# Patient Record
Sex: FEMALE | Race: WHITE | NOT HISPANIC OR LATINO
[De-identification: names, ages, dates, MRNs, and addresses within clinical notes are randomized per-mention and may not be internally consistent; named-entity substitution may affect disease eponyms.]

---

## 2020-05-12 VITALS
WEIGHT: 149.91 LBS | OXYGEN SATURATION: 100 % | SYSTOLIC BLOOD PRESSURE: 134 MMHG | TEMPERATURE: 98 F | HEART RATE: 54 BPM | RESPIRATION RATE: 16 BRPM | DIASTOLIC BLOOD PRESSURE: 86 MMHG | HEIGHT: 64 IN

## 2020-05-13 ENCOUNTER — RESULT REVIEW (OUTPATIENT)
Age: 40
End: 2020-05-13

## 2020-05-13 ENCOUNTER — INPATIENT (INPATIENT)
Facility: HOSPITAL | Age: 40
LOS: 1 days | Discharge: HOME | DRG: 25 | End: 2020-05-15
Attending: NEUROLOGICAL SURGERY | Admitting: NEUROLOGICAL SURGERY
Payer: COMMERCIAL

## 2020-05-13 DIAGNOSIS — C71.8 MALIGNANT NEOPLASM OF OVERLAPPING SITES OF BRAIN: ICD-10-CM

## 2020-05-13 DIAGNOSIS — Z88.2 ALLERGY STATUS TO SULFONAMIDES: ICD-10-CM

## 2020-05-13 DIAGNOSIS — G93.6 CEREBRAL EDEMA: ICD-10-CM

## 2020-05-13 DIAGNOSIS — H53.462 HOMONYMOUS BILATERAL FIELD DEFECTS, LEFT SIDE: ICD-10-CM

## 2020-05-13 DIAGNOSIS — D43.2 NEOPLASM OF UNCERTAIN BEHAVIOR OF BRAIN, UNSPECIFIED: ICD-10-CM

## 2020-05-13 DIAGNOSIS — G93.5 COMPRESSION OF BRAIN: ICD-10-CM

## 2020-05-13 LAB
ANION GAP SERPL CALC-SCNC: 11 MMOL/L — SIGNIFICANT CHANGE UP (ref 5–17)
BLD GP AB SCN SERPL QL: NEGATIVE — SIGNIFICANT CHANGE UP
BUN SERPL-MCNC: 13 MG/DL — SIGNIFICANT CHANGE UP (ref 7–23)
CALCIUM SERPL-MCNC: 8.2 MG/DL — LOW (ref 8.4–10.5)
CHLORIDE SERPL-SCNC: 108 MMOL/L — SIGNIFICANT CHANGE UP (ref 96–108)
CO2 SERPL-SCNC: 20 MMOL/L — LOW (ref 22–31)
CREAT SERPL-MCNC: 0.52 MG/DL — SIGNIFICANT CHANGE UP (ref 0.5–1.3)
GLUCOSE BLDC GLUCOMTR-MCNC: 122 MG/DL — HIGH (ref 70–99)
GLUCOSE BLDC GLUCOMTR-MCNC: 134 MG/DL — HIGH (ref 70–99)
GLUCOSE SERPL-MCNC: 135 MG/DL — HIGH (ref 70–99)
HCT VFR BLD CALC: 37.7 % — SIGNIFICANT CHANGE UP (ref 34.5–45)
HGB BLD-MCNC: 12 G/DL — SIGNIFICANT CHANGE UP (ref 11.5–15.5)
MAGNESIUM SERPL-MCNC: 2 MG/DL — SIGNIFICANT CHANGE UP (ref 1.6–2.6)
MCHC RBC-ENTMCNC: 29.8 PG — SIGNIFICANT CHANGE UP (ref 27–34)
MCHC RBC-ENTMCNC: 31.8 GM/DL — LOW (ref 32–36)
MCV RBC AUTO: 93.5 FL — SIGNIFICANT CHANGE UP (ref 80–100)
NRBC # BLD: 0 /100 WBCS — SIGNIFICANT CHANGE UP (ref 0–0)
PHOSPHATE SERPL-MCNC: 2.7 MG/DL — SIGNIFICANT CHANGE UP (ref 2.5–4.5)
PLATELET # BLD AUTO: 225 K/UL — SIGNIFICANT CHANGE UP (ref 150–400)
POTASSIUM SERPL-MCNC: 3.9 MMOL/L — SIGNIFICANT CHANGE UP (ref 3.5–5.3)
POTASSIUM SERPL-SCNC: 3.9 MMOL/L — SIGNIFICANT CHANGE UP (ref 3.5–5.3)
RBC # BLD: 4.03 M/UL — SIGNIFICANT CHANGE UP (ref 3.8–5.2)
RBC # FLD: 11.8 % — SIGNIFICANT CHANGE UP (ref 10.3–14.5)
RH IG SCN BLD-IMP: POSITIVE — SIGNIFICANT CHANGE UP
SODIUM SERPL-SCNC: 139 MMOL/L — SIGNIFICANT CHANGE UP (ref 135–145)
WBC # BLD: 6.65 K/UL — SIGNIFICANT CHANGE UP (ref 3.8–10.5)
WBC # FLD AUTO: 6.65 K/UL — SIGNIFICANT CHANGE UP (ref 3.8–10.5)

## 2020-05-13 PROCEDURE — 70450 CT HEAD/BRAIN W/O DYE: CPT | Mod: 26

## 2020-05-13 PROCEDURE — 88331 PATH CONSLTJ SURG 1 BLK 1SPC: CPT | Mod: 26

## 2020-05-13 PROCEDURE — 88360 TUMOR IMMUNOHISTOCHEM/MANUAL: CPT | Mod: 26

## 2020-05-13 PROCEDURE — 70552 MRI BRAIN STEM W/DYE: CPT | Mod: 26

## 2020-05-13 PROCEDURE — 88341 IMHCHEM/IMCYTCHM EA ADD ANTB: CPT | Mod: 26,59

## 2020-05-13 PROCEDURE — 88342 IMHCHEM/IMCYTCHM 1ST ANTB: CPT | Mod: 26,59

## 2020-05-13 PROCEDURE — 88307 TISSUE EXAM BY PATHOLOGIST: CPT | Mod: 26

## 2020-05-13 PROCEDURE — 88334 PATH CONSLTJ SURG CYTO XM EA: CPT | Mod: 26,59

## 2020-05-13 RX ORDER — DEXTROSE 50 % IN WATER 50 %
25 SYRINGE (ML) INTRAVENOUS ONCE
Refills: 0 | Status: DISCONTINUED | OUTPATIENT
Start: 2020-05-13 | End: 2020-05-15

## 2020-05-13 RX ORDER — LEVETIRACETAM 250 MG/1
500 TABLET, FILM COATED ORAL
Refills: 0 | Status: DISCONTINUED | OUTPATIENT
Start: 2020-05-13 | End: 2020-05-15

## 2020-05-13 RX ORDER — OXYCODONE AND ACETAMINOPHEN 5; 325 MG/1; MG/1
1 TABLET ORAL EVERY 4 HOURS
Refills: 0 | Status: DISCONTINUED | OUTPATIENT
Start: 2020-05-13 | End: 2020-05-15

## 2020-05-13 RX ORDER — DEXTROSE 50 % IN WATER 50 %
12.5 SYRINGE (ML) INTRAVENOUS ONCE
Refills: 0 | Status: DISCONTINUED | OUTPATIENT
Start: 2020-05-13 | End: 2020-05-15

## 2020-05-13 RX ORDER — POVIDONE-IODINE 5 %
1 AEROSOL (ML) TOPICAL ONCE
Refills: 0 | Status: DISCONTINUED | OUTPATIENT
Start: 2020-05-13 | End: 2020-05-13

## 2020-05-13 RX ORDER — DEXTROSE 50 % IN WATER 50 %
15 SYRINGE (ML) INTRAVENOUS ONCE
Refills: 0 | Status: DISCONTINUED | OUTPATIENT
Start: 2020-05-13 | End: 2020-05-15

## 2020-05-13 RX ORDER — HYDROMORPHONE HYDROCHLORIDE 2 MG/ML
0.5 INJECTION INTRAMUSCULAR; INTRAVENOUS; SUBCUTANEOUS EVERY 4 HOURS
Refills: 0 | Status: DISCONTINUED | OUTPATIENT
Start: 2020-05-13 | End: 2020-05-15

## 2020-05-13 RX ORDER — DEXTROSE MONOHYDRATE, SODIUM CHLORIDE, AND POTASSIUM CHLORIDE 50; .745; 4.5 G/1000ML; G/1000ML; G/1000ML
1000 INJECTION, SOLUTION INTRAVENOUS
Refills: 0 | Status: DISCONTINUED | OUTPATIENT
Start: 2020-05-13 | End: 2020-05-14

## 2020-05-13 RX ORDER — OXYCODONE AND ACETAMINOPHEN 5; 325 MG/1; MG/1
2 TABLET ORAL EVERY 6 HOURS
Refills: 0 | Status: DISCONTINUED | OUTPATIENT
Start: 2020-05-13 | End: 2020-05-15

## 2020-05-13 RX ORDER — ACETAMINOPHEN 500 MG
1000 TABLET ORAL ONCE
Refills: 0 | Status: COMPLETED | OUTPATIENT
Start: 2020-05-13 | End: 2020-05-13

## 2020-05-13 RX ORDER — PANTOPRAZOLE SODIUM 20 MG/1
40 TABLET, DELAYED RELEASE ORAL
Refills: 0 | Status: DISCONTINUED | OUTPATIENT
Start: 2020-05-13 | End: 2020-05-15

## 2020-05-13 RX ORDER — GLUCAGON INJECTION, SOLUTION 0.5 MG/.1ML
1 INJECTION, SOLUTION SUBCUTANEOUS ONCE
Refills: 0 | Status: DISCONTINUED | OUTPATIENT
Start: 2020-05-13 | End: 2020-05-15

## 2020-05-13 RX ORDER — INSULIN LISPRO 100/ML
VIAL (ML) SUBCUTANEOUS
Refills: 0 | Status: DISCONTINUED | OUTPATIENT
Start: 2020-05-13 | End: 2020-05-15

## 2020-05-13 RX ORDER — DEXAMETHASONE 0.5 MG/5ML
4 ELIXIR ORAL EVERY 6 HOURS
Refills: 0 | Status: DISCONTINUED | OUTPATIENT
Start: 2020-05-13 | End: 2020-05-14

## 2020-05-13 RX ORDER — HYDROMORPHONE HYDROCHLORIDE 2 MG/ML
0.5 INJECTION INTRAMUSCULAR; INTRAVENOUS; SUBCUTANEOUS ONCE
Refills: 0 | Status: DISCONTINUED | OUTPATIENT
Start: 2020-05-13 | End: 2020-05-13

## 2020-05-13 RX ORDER — CEFAZOLIN SODIUM 1 G
2000 VIAL (EA) INJECTION EVERY 8 HOURS
Refills: 0 | Status: COMPLETED | OUTPATIENT
Start: 2020-05-13 | End: 2020-05-14

## 2020-05-13 RX ORDER — SODIUM CHLORIDE 9 MG/ML
1000 INJECTION, SOLUTION INTRAVENOUS
Refills: 0 | Status: DISCONTINUED | OUTPATIENT
Start: 2020-05-13 | End: 2020-05-15

## 2020-05-13 RX ADMIN — Medication 400 MILLIGRAM(S): at 12:18

## 2020-05-13 RX ADMIN — HYDROMORPHONE HYDROCHLORIDE 0.5 MILLIGRAM(S): 2 INJECTION INTRAMUSCULAR; INTRAVENOUS; SUBCUTANEOUS at 13:18

## 2020-05-13 RX ADMIN — Medication 4 MILLIGRAM(S): at 14:08

## 2020-05-13 RX ADMIN — LEVETIRACETAM 500 MILLIGRAM(S): 250 TABLET, FILM COATED ORAL at 17:03

## 2020-05-13 RX ADMIN — Medication 100 MILLIGRAM(S): at 14:56

## 2020-05-13 RX ADMIN — DEXTROSE MONOHYDRATE, SODIUM CHLORIDE, AND POTASSIUM CHLORIDE 75 MILLILITER(S): 50; .745; 4.5 INJECTION, SOLUTION INTRAVENOUS at 13:12

## 2020-05-13 RX ADMIN — Medication 100 MILLIGRAM(S): at 22:06

## 2020-05-13 RX ADMIN — Medication 1 TABLET(S): at 12:46

## 2020-05-13 RX ADMIN — Medication 4 MILLIGRAM(S): at 20:57

## 2020-05-13 NOTE — H&P PST ADULT - ASSESSMENT
40 year old female here for elective right craniotomy for parietal / occipital mass    -ICU postop  -CT   -MRI  -decadron / keppra

## 2020-05-13 NOTE — H&P PST ADULT - HISTORY OF PRESENT ILLNESS
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 She was seen originally at [facility] for evaluation of headaches, and signs of blurry vision.    She presents now 5/13/20 for right craniotomy resection of brain tumor.

## 2020-05-13 NOTE — PROGRESS NOTE ADULT - ASSESSMENT
POD 0 s/p right crani and tumor resection with EVD in place neuro intact post op   q1 hr neuro checks  CTH now for follow up  q1h vitals  pain control  HOB 30 deg  trx to NSICU pending bed avail  keppra ppx  decadron 4q6h  MRI in 48 hrs  ADAT  NS hydration  PPI  post op labs stable  lovenox ppx tomorrow  PT/OT pending  d/w Dr. Montes De Oca and Bronwyn

## 2020-05-13 NOTE — PACU DISCHARGE NOTE - COMMENTS
pt met criteria for discharge - s/p right parietal/ occipital craniotomy with helmet dressing-  neurologically intact- AXOX4- follow commands , moving all extremities- + PERRLA- No neurological and sensory deficits- tolerating room air- denies Pain at present- EVD to level of tragus @ 10cm H20- ICP WNL- keller draining increasingly up to 250ml/hr for 10pm output-Messi Sterling notified- no intervention at present -continue to monitor - report given to 5 [facility] nurse- pt left unit via bed on monitor to [id number] accompanied by PA and RN

## 2020-05-13 NOTE — PROGRESS NOTE ADULT - SUBJECTIVE AND OBJECTIVE BOX
Post op Note    Dx: brain tumor    40y    Female   s/p right crani and tumor resection this morning. No complications intra op, post op transferred to PACU extubated. Post op reports moderate headache, dosed with 0.5mg IV dilaudid. Denies any blurry vision, n/v or blury vision. Denies any weakness, numbness or tingling.           T(C): 36.2 (05-13-20 @ 11:50), Max: 36.4 (05-12-20 @ 13:29)  HR: 52 (05-13-20 @ 13:00) (50 - 67)  BP: 114/68 (05-13-20 @ 13:00) (106/60 - 134/86)  RR: 16 (05-13-20 @ 13:00) (10 - 16)  SpO2: 100% (05-13-20 @ 13:00) (100% - 100%)  Wt(kg): --      Physical exam  Awake, alert, oriented x 3, PERRL, EOMI  tongue midline  face symmetric  Follows commands, speech clear  BARRAGAN X4 with good strength   head dressing: C/D/I

## 2020-05-14 DIAGNOSIS — D49.6 NEOPLASM OF UNSPECIFIED BEHAVIOR OF BRAIN: ICD-10-CM

## 2020-05-14 LAB
A1C WITH ESTIMATED AVERAGE GLUCOSE RESULT: 4.5 % — SIGNIFICANT CHANGE UP (ref 4–5.6)
ANION GAP SERPL CALC-SCNC: 10 MMOL/L — SIGNIFICANT CHANGE UP (ref 5–17)
ANION GAP SERPL CALC-SCNC: 11 MMOL/L — SIGNIFICANT CHANGE UP (ref 5–17)
ANION GAP SERPL CALC-SCNC: 13 MMOL/L — SIGNIFICANT CHANGE UP (ref 5–17)
APPEARANCE UR: CLEAR — SIGNIFICANT CHANGE UP
APPEARANCE UR: CLEAR — SIGNIFICANT CHANGE UP
BILIRUB UR-MCNC: NEGATIVE — SIGNIFICANT CHANGE UP
BILIRUB UR-MCNC: NEGATIVE — SIGNIFICANT CHANGE UP
BUN SERPL-MCNC: 10 MG/DL — SIGNIFICANT CHANGE UP (ref 7–23)
BUN SERPL-MCNC: 8 MG/DL — SIGNIFICANT CHANGE UP (ref 7–23)
BUN SERPL-MCNC: 9 MG/DL — SIGNIFICANT CHANGE UP (ref 7–23)
CALCIUM SERPL-MCNC: 8.4 MG/DL — SIGNIFICANT CHANGE UP (ref 8.4–10.5)
CALCIUM SERPL-MCNC: 8.8 MG/DL — SIGNIFICANT CHANGE UP (ref 8.4–10.5)
CALCIUM SERPL-MCNC: 8.8 MG/DL — SIGNIFICANT CHANGE UP (ref 8.4–10.5)
CHLORIDE SERPL-SCNC: 102 MMOL/L — SIGNIFICANT CHANGE UP (ref 96–108)
CHLORIDE SERPL-SCNC: 104 MMOL/L — SIGNIFICANT CHANGE UP (ref 96–108)
CHLORIDE SERPL-SCNC: 104 MMOL/L — SIGNIFICANT CHANGE UP (ref 96–108)
CO2 SERPL-SCNC: 21 MMOL/L — LOW (ref 22–31)
CO2 SERPL-SCNC: 24 MMOL/L — SIGNIFICANT CHANGE UP (ref 22–31)
CO2 SERPL-SCNC: 24 MMOL/L — SIGNIFICANT CHANGE UP (ref 22–31)
COLOR SPEC: YELLOW — SIGNIFICANT CHANGE UP
COLOR SPEC: YELLOW — SIGNIFICANT CHANGE UP
CREAT SERPL-MCNC: 0.48 MG/DL — LOW (ref 0.5–1.3)
CREAT SERPL-MCNC: 0.48 MG/DL — LOW (ref 0.5–1.3)
CREAT SERPL-MCNC: 0.52 MG/DL — SIGNIFICANT CHANGE UP (ref 0.5–1.3)
DIFF PNL FLD: ABNORMAL
DIFF PNL FLD: NEGATIVE — SIGNIFICANT CHANGE UP
ESTIMATED AVERAGE GLUCOSE: 82 MG/DL — SIGNIFICANT CHANGE UP (ref 68–114)
GLUCOSE BLDC GLUCOMTR-MCNC: 107 MG/DL — HIGH (ref 70–99)
GLUCOSE BLDC GLUCOMTR-MCNC: 117 MG/DL — HIGH (ref 70–99)
GLUCOSE BLDC GLUCOMTR-MCNC: 125 MG/DL — HIGH (ref 70–99)
GLUCOSE BLDC GLUCOMTR-MCNC: 132 MG/DL — HIGH (ref 70–99)
GLUCOSE SERPL-MCNC: 124 MG/DL — HIGH (ref 70–99)
GLUCOSE SERPL-MCNC: 138 MG/DL — HIGH (ref 70–99)
GLUCOSE SERPL-MCNC: 166 MG/DL — HIGH (ref 70–99)
GLUCOSE UR QL: NEGATIVE — SIGNIFICANT CHANGE UP
GLUCOSE UR QL: NEGATIVE — SIGNIFICANT CHANGE UP
HCT VFR BLD CALC: 38.8 % — SIGNIFICANT CHANGE UP (ref 34.5–45)
HGB BLD-MCNC: 12.4 G/DL — SIGNIFICANT CHANGE UP (ref 11.5–15.5)
KETONES UR-MCNC: ABNORMAL MG/DL
KETONES UR-MCNC: NEGATIVE — SIGNIFICANT CHANGE UP
LEUKOCYTE ESTERASE UR-ACNC: NEGATIVE — SIGNIFICANT CHANGE UP
LEUKOCYTE ESTERASE UR-ACNC: NEGATIVE — SIGNIFICANT CHANGE UP
MAGNESIUM SERPL-MCNC: 2 MG/DL — SIGNIFICANT CHANGE UP (ref 1.6–2.6)
MCHC RBC-ENTMCNC: 29.8 PG — SIGNIFICANT CHANGE UP (ref 27–34)
MCHC RBC-ENTMCNC: 32 GM/DL — SIGNIFICANT CHANGE UP (ref 32–36)
MCV RBC AUTO: 93.3 FL — SIGNIFICANT CHANGE UP (ref 80–100)
NITRITE UR-MCNC: NEGATIVE — SIGNIFICANT CHANGE UP
NITRITE UR-MCNC: NEGATIVE — SIGNIFICANT CHANGE UP
NRBC # BLD: 0 /100 WBCS — SIGNIFICANT CHANGE UP (ref 0–0)
OSMOLALITY SERPL: 284 MOSMOL/KG — SIGNIFICANT CHANGE UP (ref 275–300)
OSMOLALITY UR: 322 MOSMOL/KG — SIGNIFICANT CHANGE UP (ref 100–650)
PH UR: 7 — SIGNIFICANT CHANGE UP (ref 5–8)
PH UR: 7 — SIGNIFICANT CHANGE UP (ref 5–8)
PHOSPHATE SERPL-MCNC: 2.6 MG/DL — SIGNIFICANT CHANGE UP (ref 2.5–4.5)
PLATELET # BLD AUTO: 246 K/UL — SIGNIFICANT CHANGE UP (ref 150–400)
POTASSIUM SERPL-MCNC: 4.1 MMOL/L — SIGNIFICANT CHANGE UP (ref 3.5–5.3)
POTASSIUM SERPL-MCNC: 4.2 MMOL/L — SIGNIFICANT CHANGE UP (ref 3.5–5.3)
POTASSIUM SERPL-MCNC: 4.2 MMOL/L — SIGNIFICANT CHANGE UP (ref 3.5–5.3)
POTASSIUM SERPL-SCNC: 4.1 MMOL/L — SIGNIFICANT CHANGE UP (ref 3.5–5.3)
POTASSIUM SERPL-SCNC: 4.2 MMOL/L — SIGNIFICANT CHANGE UP (ref 3.5–5.3)
POTASSIUM SERPL-SCNC: 4.2 MMOL/L — SIGNIFICANT CHANGE UP (ref 3.5–5.3)
PROT UR-MCNC: NEGATIVE MG/DL — SIGNIFICANT CHANGE UP
PROT UR-MCNC: NEGATIVE MG/DL — SIGNIFICANT CHANGE UP
RBC # BLD: 4.16 M/UL — SIGNIFICANT CHANGE UP (ref 3.8–5.2)
RBC # FLD: 11.9 % — SIGNIFICANT CHANGE UP (ref 10.3–14.5)
SODIUM SERPL-SCNC: 137 MMOL/L — SIGNIFICANT CHANGE UP (ref 135–145)
SODIUM SERPL-SCNC: 138 MMOL/L — SIGNIFICANT CHANGE UP (ref 135–145)
SODIUM SERPL-SCNC: 138 MMOL/L — SIGNIFICANT CHANGE UP (ref 135–145)
SODIUM UR-SCNC: 131 MMOL/L — SIGNIFICANT CHANGE UP
SP GR SPEC: 1.02 — SIGNIFICANT CHANGE UP (ref 1–1.03)
SP GR SPEC: 1.02 — SIGNIFICANT CHANGE UP (ref 1–1.03)
UROBILINOGEN FLD QL: 0.2 E.U./DL — SIGNIFICANT CHANGE UP
UROBILINOGEN FLD QL: 0.2 E.U./DL — SIGNIFICANT CHANGE UP
WBC # BLD: 12.97 K/UL — HIGH (ref 3.8–10.5)
WBC # FLD AUTO: 12.97 K/UL — HIGH (ref 3.8–10.5)

## 2020-05-14 PROCEDURE — 70553 MRI BRAIN STEM W/O & W/DYE: CPT | Mod: 26

## 2020-05-14 PROCEDURE — 99024 POSTOP FOLLOW-UP VISIT: CPT

## 2020-05-14 RX ORDER — ACETAMINOPHEN 500 MG
650 TABLET ORAL EVERY 6 HOURS
Refills: 0 | Status: DISCONTINUED | OUTPATIENT
Start: 2020-05-14 | End: 2020-05-15

## 2020-05-14 RX ORDER — DEXAMETHASONE 0.5 MG/5ML
ELIXIR ORAL
Refills: 0 | Status: DISCONTINUED | OUTPATIENT
Start: 2020-05-14 | End: 2020-05-15

## 2020-05-14 RX ORDER — DEXAMETHASONE 0.5 MG/5ML
2 ELIXIR ORAL EVERY 8 HOURS
Refills: 0 | Status: CANCELLED | OUTPATIENT
Start: 2020-05-18 | End: 2020-05-15

## 2020-05-14 RX ORDER — SODIUM CHLORIDE 9 MG/ML
1000 INJECTION INTRAMUSCULAR; INTRAVENOUS; SUBCUTANEOUS ONCE
Refills: 0 | Status: COMPLETED | OUTPATIENT
Start: 2020-05-14 | End: 2020-05-14

## 2020-05-14 RX ORDER — DEXAMETHASONE 0.5 MG/5ML
2 ELIXIR ORAL
Refills: 0 | Status: CANCELLED | OUTPATIENT
Start: 2020-05-20 | End: 2020-05-15

## 2020-05-14 RX ORDER — DEXAMETHASONE 0.5 MG/5ML
4 ELIXIR ORAL EVERY 6 HOURS
Refills: 0 | Status: DISCONTINUED | OUTPATIENT
Start: 2020-05-14 | End: 2020-05-15

## 2020-05-14 RX ORDER — SENNA PLUS 8.6 MG/1
2 TABLET ORAL AT BEDTIME
Refills: 0 | Status: DISCONTINUED | OUTPATIENT
Start: 2020-05-14 | End: 2020-05-15

## 2020-05-14 RX ORDER — DEXTROSE MONOHYDRATE, SODIUM CHLORIDE, AND POTASSIUM CHLORIDE 50; .745; 4.5 G/1000ML; G/1000ML; G/1000ML
1000 INJECTION, SOLUTION INTRAVENOUS
Refills: 0 | Status: DISCONTINUED | OUTPATIENT
Start: 2020-05-14 | End: 2020-05-15

## 2020-05-14 RX ORDER — DEXAMETHASONE 0.5 MG/5ML
4 ELIXIR ORAL EVERY 8 HOURS
Refills: 0 | Status: DISCONTINUED | OUTPATIENT
Start: 2020-05-16 | End: 2020-05-15

## 2020-05-14 RX ORDER — HYDRALAZINE HCL 50 MG
5 TABLET ORAL ONCE
Refills: 0 | Status: COMPLETED | OUTPATIENT
Start: 2020-05-14 | End: 2020-05-14

## 2020-05-14 RX ORDER — SODIUM,POTASSIUM PHOSPHATES 278-250MG
1 POWDER IN PACKET (EA) ORAL ONCE
Refills: 0 | Status: COMPLETED | OUTPATIENT
Start: 2020-05-14 | End: 2020-05-14

## 2020-05-14 RX ADMIN — Medication 4 MILLIGRAM(S): at 02:51

## 2020-05-14 RX ADMIN — Medication 4 MILLIGRAM(S): at 07:51

## 2020-05-14 RX ADMIN — Medication 4 MILLIGRAM(S): at 12:59

## 2020-05-14 RX ADMIN — Medication 5 MILLIGRAM(S): at 06:30

## 2020-05-14 RX ADMIN — PANTOPRAZOLE SODIUM 40 MILLIGRAM(S): 20 TABLET, DELAYED RELEASE ORAL at 06:00

## 2020-05-14 RX ADMIN — SENNA PLUS 2 TABLET(S): 8.6 TABLET ORAL at 23:05

## 2020-05-14 RX ADMIN — DEXTROSE MONOHYDRATE, SODIUM CHLORIDE, AND POTASSIUM CHLORIDE 100 MILLILITER(S): 50; .745; 4.5 INJECTION, SOLUTION INTRAVENOUS at 15:54

## 2020-05-14 RX ADMIN — DEXTROSE MONOHYDRATE, SODIUM CHLORIDE, AND POTASSIUM CHLORIDE 75 MILLILITER(S): 50; .745; 4.5 INJECTION, SOLUTION INTRAVENOUS at 02:50

## 2020-05-14 RX ADMIN — Medication 1 TABLET(S): at 12:59

## 2020-05-14 RX ADMIN — Medication 100 MILLIGRAM(S): at 06:30

## 2020-05-14 RX ADMIN — Medication 1 PACKET(S): at 12:59

## 2020-05-14 RX ADMIN — LEVETIRACETAM 500 MILLIGRAM(S): 250 TABLET, FILM COATED ORAL at 06:00

## 2020-05-14 RX ADMIN — Medication 4 MILLIGRAM(S): at 17:48

## 2020-05-14 RX ADMIN — LEVETIRACETAM 500 MILLIGRAM(S): 250 TABLET, FILM COATED ORAL at 17:48

## 2020-05-14 RX ADMIN — SODIUM CHLORIDE 2000 MILLILITER(S): 9 INJECTION INTRAMUSCULAR; INTRAVENOUS; SUBCUTANEOUS at 11:07

## 2020-05-14 RX ADMIN — Medication 4 MILLIGRAM(S): at 23:05

## 2020-05-14 NOTE — PROGRESS NOTE ADULT - SUBJECTIVE AND OBJECTIVE BOX
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    She presents now 19 for right craniotomy resection of brain tumor. (13 May 2020 16:04)    Hospital Course:    POD#1 s/p right crani and tumor resection with EVD @ 10Cm H2O, post-op Left homon hemianop, DeC/Keppra,  HCT post-op reviewed, pending MRI post-op, SQL start today DVT proph      Vital Signs Last 24 Hrs  T(C): 36.8 (14 May 2020 01:10), Max: 36.8 (14 May 2020 01:10)  T(F): 98.2 (14 May 2020 01:10), Max: 98.2 (14 May 2020 01:10)  HR: 53 (14 May 2020 04:00) (50 - 71)  BP: 159/77 (14 May 2020 04:00) (106/60 - 159/77)  BP(mean): 111 (14 May 2020 04:00) (77 - 111)  RR: 14 (14 May 2020 04:00) (10 - 20)  SpO2: 99% (14 May 2020 04:00) (97% - 100%)    I&O's Detail    13 May 2020 07:  -  14 May 2020 04:49  --------------------------------------------------------  IN:    Oral Fluid: 200 mL    sodium chloride 0.9% with potassium chloride 20 mEq/L: 3050 mL    Solution: 100 mL  Total IN: 3350 mL    OUT:    Drain: 81 mL    Estimated Blood Loss: 25 mL    Indwelling Catheter - Urethral: 3170 mL  Total OUT: 3276 mL    Total NET: 74 mL        I&O's Summary    13 May 2020 07:  -  14 May 2020 04:49  --------------------------------------------------------  IN: 3350 mL / OUT: 3276 mL / NET: 74 mL        PHYSICAL EXAM:  Neurological:  Awake, alert, oriented x 3, coherent speech, PERRL, EOMI  tongue midline  face symmetric  Left homon hemianop  Follows commands, speech clear  BARRAGAN X4 with good strength   head dressing: C/D/I    Incision/Wound: c/d/i    DEVICE/DRAIN DRESSING:    TUBES/LINES:  [] CVC  [x] A-line  [] Lumbar Drain  [x] Ventriculostomy @10cm H2O  [] Other    DIET:  [] NPO  [x] Mechanical  [] Tube feeds    LABS:                        Pending AM Collection      Urinalysis Basic - ( 14 May 2020 00:22 )    Color: Yellow / Appearance: Clear / S.020 / pH: x  Gluc: x / Ketone: NEGATIVE  / Bili: Negative / Urobili: 0.2 E.U./dL   Blood: x / Protein: NEGATIVE mg/dL / Nitrite: NEGATIVE   Leuk Esterase: NEGATIVE / RBC: 5-10 /HPF / WBC < 5 /HPF   Sq Epi: x / Non Sq Epi: 0-5 /HPF / Bacteria: Present /HPF          CAPILLARY BLOOD GLUCOSE      POCT Blood Glucose.: 134 mg/dL (13 May 2020 21:17)  POCT Blood Glucose.: 122 mg/dL (13 May 2020 16:26)      Drug Levels: [] N/A    CSF Analysis: [] N/A      Allergies    sulfa drugs (Other)    Intolerances      MEDICATIONS:  Antibiotics:  ceFAZolin   IVPB 2000 milliGRAM(s) IV Intermittent every 8 hours    Neuro:  HYDROmorphone  Injectable 0.5 milliGRAM(s) IV Push every 4 hours PRN  levETIRAcetam 500 milliGRAM(s) Oral two times a day  oxycodone    5 mG/acetaminophen 325 mG 1 Tablet(s) Oral every 4 hours PRN  oxycodone    5 mG/acetaminophen 325 mG 2 Tablet(s) Oral every 6 hours PRN    Anticoagulation:    OTHER:  dexAMETHasone     Tablet 4 milliGRAM(s) Oral every 6 hours  dextrose 40% Gel 15 Gram(s) Oral once PRN  dextrose 50% Injectable 12.5 Gram(s) IV Push once  dextrose 50% Injectable 25 Gram(s) IV Push once  dextrose 50% Injectable 25 Gram(s) IV Push once  glucagon  Injectable 1 milliGRAM(s) IntraMuscular once PRN  insulin lispro (HumaLOG) corrective regimen sliding scale   SubCutaneous Before meals and at bedtime  pantoprazole    Tablet 40 milliGRAM(s) Oral before breakfast    IVF:  dextrose 5%. 1000 milliLiter(s) IV Continuous <Continuous>  multivitamin 1 Tablet(s) Oral daily  sodium chloride 0.9% with potassium chloride 20 mEq/L 1000 milliLiter(s) IV Continuous <Continuous>    CULTURES:    RADIOLOGY & ADDITIONAL TESTS:      ASSESSMENT:  40y Female  POD#1 () s/p right crani and tumor resection with EVD @ 10cm H2O,    C71.3 G93.0  Handoff  Brain tumor  Brain tumor  Craniotomy for brain tumor using navigation system      PLAN:  NEURO:  Keppra 500q12  Decadron 4q6,  EVD @ 10cm H2O, transduce ICP every 1 hour  MRI brain post-op = pending    CARDIOVASCULAR:  SBP<140     PULMONARY:  IS      RENAL:  IVL    GI:  Bowel Regimen  Reg Diet    HEME:  trend H/H    ID:  monitor for fevers  trend WBC    ENDO:  ISS    DVT PROPHYLAXIS:  [x] Venodynes                                [x] Heparin/Lovenox    FALL RISK:  [] Low Risk                                    [] Impulsive    DISPOSITION:   ICU Status  Full Code  PT/OT eval pending

## 2020-05-14 NOTE — PHYSICAL THERAPY INITIAL EVALUATION ADULT - PERTINENT HX OF CURRENT PROBLEM, REHAB EVAL
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

## 2020-05-14 NOTE — PHYSICAL THERAPY INITIAL EVALUATION ADULT - GENERAL OBSERVATIONS, REHAB EVAL
Patient received in bed in no acute distress, +EVD (clamped), A-line, KENNY keller. IV, cranial dressing C/D/I. Patient with left homonymous hemianopsia. States difficulty using her phone but able to read time on clock from 10 feet away.

## 2020-05-14 NOTE — PHYSICAL THERAPY INITIAL EVALUATION ADULT - ADDITIONAL COMMENTS
Patient lives with family in private house with steps, is right handed, wears glasses.    Face symmetrical with tongue in midline, tracks in all planes, left homonymous hemianopsia, shoulder shrug equal.

## 2020-05-14 NOTE — CHART NOTE - NSCHARTNOTEFT_GEN_A_CORE
Admitting Diagnosis:   Patient is a 40y old  Female who presents with a chief complaint of     Consult: Yes [   ]  No [  X ]    Reason for Initial Nutrition Assessment: LOS       PAST MEDICAL & SURGICAL HISTORY:      Current Nutrition Order: Regular diet     PO Intake: Good (%) [   ]  Fair (50-75%) [   ] Poor (<25%) [   ]- Please see Below     GI Issues: WDL per flow sheets     Pain: none per flow sheets     Skin: no edema/pressure ulcers noted at this time     Labs:   05-14    137  |  102  |  8   ----------------------------<  124<H>  4.1   |  24  |  0.48<L>    Ca    8.8      14 May 2020 07:08  Phos  2.6     05-14  Mg     2.0     05-14      CAPILLARY BLOOD GLUCOSE      POCT Blood Glucose.: 107 mg/dL (14 May 2020 07:47)  POCT Blood Glucose.: 134 mg/dL (13 May 2020 21:17)  POCT Blood Glucose.: 122 mg/dL (13 May 2020 16:26)    Nutritionally Pertinent Lab Values:    Medications:  MEDICATIONS  (STANDING):  dexAMETHasone     Tablet 4 milliGRAM(s) Oral every 6 hours  dextrose 5%. 1000 milliLiter(s) (50 mL/Hr) IV Continuous <Continuous>  dextrose 50% Injectable 12.5 Gram(s) IV Push once  dextrose 50% Injectable 25 Gram(s) IV Push once  dextrose 50% Injectable 25 Gram(s) IV Push once  insulin lispro (HumaLOG) corrective regimen sliding scale   SubCutaneous Before meals and at bedtime  levETIRAcetam 500 milliGRAM(s) Oral two times a day  multivitamin 1 Tablet(s) Oral daily  pantoprazole    Tablet 40 milliGRAM(s) Oral before breakfast  sodium chloride 0.9% with potassium chloride 20 mEq/L 1000 milliLiter(s) (75 mL/Hr) IV Continuous <Continuous>    MEDICATIONS  (PRN):  dextrose 40% Gel 15 Gram(s) Oral once PRN Blood Glucose LESS THAN 70 milliGRAM(s)/deciliter  glucagon  Injectable 1 milliGRAM(s) IntraMuscular once PRN Glucose LESS THAN 70 milligrams/deciliter  HYDROmorphone  Injectable 0.5 milliGRAM(s) IV Push every 4 hours PRN break through pain  oxycodone    5 mG/acetaminophen 325 mG 1 Tablet(s) Oral every 4 hours PRN Moderate Pain (4 - 6)  oxycodone    5 mG/acetaminophen 325 mG 2 Tablet(s) Oral every 6 hours PRN Severe Pain (7 - 10)      Admitted Anthropometrics:    Weight:  Daily     Daily     Weight Change:     Nutrition Focused Physical Exam: Completed [   ]  Unable to complete [   ]  Muscle Wasting:  Subcutaneous Fat Wasting:    Estimated energy needs:     Subjective:   41yo F originally at [facility] for evaluation of headaches, and signs of blurry vision who  now presents now 5/13 for right craniotomy resection of brain tumor. s/p Craniotomy for brain tumor using navigation system 5/13. MRI brain post-op pending 5/14.  Please see below for nutritions recommendations.     Nutrition Diagnosis: Increased nutrient needs RT increased demand for energy and protein AEB s/p SX (craniotomy resection of brain tumor)  Goal: Pt will meet at least 75% of nutrient needs     Recommendations:    Education:     Risk Level: High [   ] Moderate [   ] Low [   ] Admitting Diagnosis:   Patient is a 40y old  Female who presents with a chief complaint of     Consult: Yes [   ]  No [  X ]    Reason for Initial Nutrition Assessment: LOS       PAST MEDICAL & SURGICAL HISTORY:      Current Nutrition Order: Regular diet     PO Intake: Good (%) [   ]  Fair (50-75%) [   ] Poor (<25%) [   ]- Please see Below     GI Issues: WDL per flow sheets     Pain: none per flow sheets     Skin: no edema/pressure ulcers noted at this time     Labs:   05-14    137  |  102  |  8   ----------------------------<  124<H>  4.1   |  24  |  0.48<L>    Ca    8.8      14 May 2020 07:08  Phos  2.6     05-14  Mg     2.0     05-14      CAPILLARY BLOOD GLUCOSE      POCT Blood Glucose.: 107 mg/dL (14 May 2020 07:47)  POCT Blood Glucose.: 134 mg/dL (13 May 2020 21:17)  POCT Blood Glucose.: 122 mg/dL (13 May 2020 16:26)    Nutritionally Pertinent Lab Values:    Medications:  MEDICATIONS  (STANDING):  dexAMETHasone     Tablet 4 milliGRAM(s) Oral every 6 hours  dextrose 5%. 1000 milliLiter(s) (50 mL/Hr) IV Continuous <Continuous>  dextrose 50% Injectable 12.5 Gram(s) IV Push once  dextrose 50% Injectable 25 Gram(s) IV Push once  dextrose 50% Injectable 25 Gram(s) IV Push once  insulin lispro (HumaLOG) corrective regimen sliding scale   SubCutaneous Before meals and at bedtime  levETIRAcetam 500 milliGRAM(s) Oral two times a day  multivitamin 1 Tablet(s) Oral daily  pantoprazole    Tablet 40 milliGRAM(s) Oral before breakfast  sodium chloride 0.9% with potassium chloride 20 mEq/L 1000 milliLiter(s) (75 mL/Hr) IV Continuous <Continuous>    MEDICATIONS  (PRN):  dextrose 40% Gel 15 Gram(s) Oral once PRN Blood Glucose LESS THAN 70 milliGRAM(s)/deciliter  glucagon  Injectable 1 milliGRAM(s) IntraMuscular once PRN Glucose LESS THAN 70 milligrams/deciliter  HYDROmorphone  Injectable 0.5 milliGRAM(s) IV Push every 4 hours PRN break through pain  oxycodone    5 mG/acetaminophen 325 mG 1 Tablet(s) Oral every 4 hours PRN Moderate Pain (4 - 6)  oxycodone    5 mG/acetaminophen 325 mG 2 Tablet(s) Oral every 6 hours PRN Severe Pain (7 - 10)      Admitted Anthropometrics:  5'4''  pounds +-10%  5/13 149 pounds  %NHQ=234% BMI 25.7       Nutrition Focused Physical Exam: Completed [   ]  Unable to complete [   ]- NA per visual exam seems well nourished      Estimated energy needs:   IBW used to calculate energy needs due to pt's current body weight exceeding 120% of IBW  Adjusted for needs s/p SX based on age  ~1400-1650kcal/day 25-30kcal/kg  ~65-80gm/day 1.2-1.4gm/kg     Subjective:   39yo F originally at [facility] for evaluation of headaches, and signs of blurry vision who  now presents now 5/13 for right craniotomy resection of brain tumor - s/p Craniotomy for brain tumor using navigation system 5/13. MRI brain post-op pending 5/14.  Spoke with pt/RN. Regular diet PTA with good PO intake. Pt denies food allergies, however prefers to gluten free and kosher d/t personal preferences. Pt just placed on regular diet- consumed 2 HB eggs this morning, water/tea. Pt denies issues chewing/swallowing. No GI distress at this time.   Please see below for nutritions recommendations. Pending order placed. Recs made with team.     Nutrition Diagnosis: Increased nutrient needs RT increased demand for energy and protein AEB s/p SX (craniotomy resection of brain tumor)  Goal: Pt will meet at least 75% of nutrient needs     Recommendations:  1. Pt on regular diet at this time.  >> Pending order placed to add kosher / Gluten free per pt request. Docena pt food preferences as able - Kitchen made aware.   2. Monitor PO intake/appetite, GI distress, diet tolerance, labs, weights, Labs, GOC.  3. RD to remain available for additional nutrition interventions as needed.     Education: Encouraged PO intake during meals & reviewed importance.   Risk Level: High [   ] Moderate [ X  ] Low [   ] Spouse/Significant other

## 2020-05-14 NOTE — CONSULT NOTE ADULT - SUBJECTIVE AND OBJECTIVE BOX
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 for evaluation of headaches, and signs of blurry vision.    She presents now 19 for right craniotomy resection of brain tumor. (13 May 2020 16:04)      Addl  Medical issues:       HEALTH ISSUES - PROBLEM Dx:            MEDICATIONS  (STANDING):  bisacodyl 5 milliGRAM(s) Oral at bedtime  dexAMETHasone     Tablet   Oral   dexAMETHasone     Tablet 4 milliGRAM(s) Oral every 6 hours  dextrose 5%. 1000 milliLiter(s) (50 mL/Hr) IV Continuous <Continuous>  dextrose 50% Injectable 12.5 Gram(s) IV Push once  dextrose 50% Injectable 25 Gram(s) IV Push once  dextrose 50% Injectable 25 Gram(s) IV Push once  insulin lispro (HumaLOG) corrective regimen sliding scale   SubCutaneous Before meals and at bedtime  levETIRAcetam 500 milliGRAM(s) Oral two times a day  multivitamin 1 Tablet(s) Oral daily  pantoprazole    Tablet 40 milliGRAM(s) Oral before breakfast  senna 2 Tablet(s) Oral at bedtime  sodium chloride 0.9% with potassium chloride 20 mEq/L 1000 milliLiter(s) (100 mL/Hr) IV Continuous <Continuous>    MEDICATIONS  (PRN):  acetaminophen   Tablet .. 650 milliGRAM(s) Oral every 6 hours PRN Temp greater or equal to 38C (100.4F), Mild Pain (1 - 3)  dextrose 40% Gel 15 Gram(s) Oral once PRN Blood Glucose LESS THAN 70 milliGRAM(s)/deciliter  glucagon  Injectable 1 milliGRAM(s) IntraMuscular once PRN Glucose LESS THAN 70 milligrams/deciliter  HYDROmorphone  Injectable 0.5 milliGRAM(s) IV Push every 4 hours PRN break through pain  oxycodone    5 mG/acetaminophen 325 mG 1 Tablet(s) Oral every 4 hours PRN Moderate Pain (4 - 6)  oxycodone    5 mG/acetaminophen 325 mG 2 Tablet(s) Oral every 6 hours PRN Severe Pain (7 - 10)          PAST MEDICAL & SURGICAL HISTORY:      REVIEW OF SYSTEMS:pod nu 1  [x] As per HPI          Reviewed   no change                            Changes noted  CONSTITUTIONAL: No fever, weight loss, or fatigue  RESPIRATORY: No cough, wheezing, chills or hemoptysis; No Shortness of Breath  CARDIOVASCULAR: No chest pain, palpitations, dizziness, or leg swelling  GASTROINTESTINAL: No abdominal or epigastric pain. No nausea, vomiting, or hematemesis; No diarrhea or constipation. No melena or hematochezia.  MUSCULOSKELETAL: No joint pain or swelling; No muscle, back, or extremity pain  Neuro:   Grossly  Negative see PA note  Psych        Awake  alert  [x] All others negative	  [ ] Unable to obtain      Vital Signs Last 24 Hrs  T(C): 37.3 (14 May 2020 18:00), Max: 37.6 (14 May 2020 16:00)  T(F): 99.1 (14 May 2020 18:00), Max: 99.7 (14 May 2020 16:00)  HR: 63 (14 May 2020 20:00) (53 - 70)  BP: 130/72 (14 May 2020 20:00) (120/59 - 170/87)  BP(mean): 96 (14 May 2020 20:00) (84 - 121)  RR: 18 (14 May 2020 20:00) (12 - 31)  SpO2: 99% (14 May 2020 20:00) (97% - 100%)    PHYSICAL                               12.4   12.97 )-----------( 246      ( 14 May 2020 07:08 )             38.8     05-14    138  |  104  |  10  ----------------------------<  166<H>  4.2   |  21<L>  |  0.48<L>    Ca    8.4      14 May 2020 15:35  Phos  2.6     05-14  Mg     2.0     05-14            CAPILLARY BLOOD GLUCOSE      POCT Blood Glucose.: 132 mg/dL (14 May 2020 16:45)  POCT Blood Glucose.: 117 mg/dL (14 May 2020 11:08)  POCT Blood Glucose.: 107 mg/dL (14 May 2020 07:47)  POCT Blood Glucose.: 134 mg/dL (13 May 2020 21:17)    Urinalysis Basic - ( 14 May 2020 11:11 )    Color: Yellow / Appearance: Clear / S.020 / pH: x  Gluc: x / Ketone: Trace mg/dL  / Bili: Negative / Urobili: 0.2 E.U./dL   Blood: x / Protein: NEGATIVE mg/dL / Nitrite: NEGATIVE   Leuk Esterase: NEGATIVE / RBC: x / WBC x   Sq Epi: x / Non Sq Epi: x / Bacteria: x      I&O's Summary    13 May 2020 07:  -  14 May 2020 07:00  --------------------------------------------------------  IN: 3787 mL / OUT: 3706 mL / NET: 81 mL    14 May 2020 07:  -  14 May 2020 20:51  --------------------------------------------------------  IN: 3132 mL / OUT: 2712 mL / NET: 420 mL            ASSESSMENT/PLAN/RECOMMENDATIONS

## 2020-05-15 ENCOUNTER — TRANSCRIPTION ENCOUNTER (OUTPATIENT)
Age: 40
End: 2020-05-15

## 2020-05-15 VITALS
OXYGEN SATURATION: 99 % | SYSTOLIC BLOOD PRESSURE: 130 MMHG | RESPIRATION RATE: 19 BRPM | HEART RATE: 62 BPM | DIASTOLIC BLOOD PRESSURE: 72 MMHG

## 2020-05-15 LAB
ALBUMIN SERPL ELPH-MCNC: 3.8 G/DL — SIGNIFICANT CHANGE UP (ref 3.3–5)
ALP SERPL-CCNC: 39 U/L — LOW (ref 40–120)
ALT FLD-CCNC: 8 U/L — LOW (ref 10–45)
ANION GAP SERPL CALC-SCNC: 14 MMOL/L — SIGNIFICANT CHANGE UP (ref 5–17)
AST SERPL-CCNC: 12 U/L — SIGNIFICANT CHANGE UP (ref 10–40)
BASOPHILS # BLD AUTO: 0.01 K/UL — SIGNIFICANT CHANGE UP (ref 0–0.2)
BASOPHILS NFR BLD AUTO: 0.1 % — SIGNIFICANT CHANGE UP (ref 0–2)
BILIRUB SERPL-MCNC: 0.5 MG/DL — SIGNIFICANT CHANGE UP (ref 0.2–1.2)
BUN SERPL-MCNC: 13 MG/DL — SIGNIFICANT CHANGE UP (ref 7–23)
CALCIUM SERPL-MCNC: 8.6 MG/DL — SIGNIFICANT CHANGE UP (ref 8.4–10.5)
CHLORIDE SERPL-SCNC: 106 MMOL/L — SIGNIFICANT CHANGE UP (ref 96–108)
CO2 SERPL-SCNC: 21 MMOL/L — LOW (ref 22–31)
CREAT SERPL-MCNC: 0.43 MG/DL — LOW (ref 0.5–1.3)
EOSINOPHIL # BLD AUTO: 0 K/UL — SIGNIFICANT CHANGE UP (ref 0–0.5)
EOSINOPHIL NFR BLD AUTO: 0 % — SIGNIFICANT CHANGE UP (ref 0–6)
GLUCOSE BLDC GLUCOMTR-MCNC: 111 MG/DL — HIGH (ref 70–99)
GLUCOSE BLDC GLUCOMTR-MCNC: 114 MG/DL — HIGH (ref 70–99)
GLUCOSE BLDC GLUCOMTR-MCNC: 116 MG/DL — HIGH (ref 70–99)
GLUCOSE SERPL-MCNC: 120 MG/DL — HIGH (ref 70–99)
HCT VFR BLD CALC: 38.1 % — SIGNIFICANT CHANGE UP (ref 34.5–45)
HGB BLD-MCNC: 12.4 G/DL — SIGNIFICANT CHANGE UP (ref 11.5–15.5)
IMM GRANULOCYTES NFR BLD AUTO: 0.6 % — SIGNIFICANT CHANGE UP (ref 0–1.5)
LYMPHOCYTES # BLD AUTO: 0.88 K/UL — LOW (ref 1–3.3)
LYMPHOCYTES # BLD AUTO: 8.1 % — LOW (ref 13–44)
MAGNESIUM SERPL-MCNC: 2 MG/DL — SIGNIFICANT CHANGE UP (ref 1.6–2.6)
MCHC RBC-ENTMCNC: 30.3 PG — SIGNIFICANT CHANGE UP (ref 27–34)
MCHC RBC-ENTMCNC: 32.5 GM/DL — SIGNIFICANT CHANGE UP (ref 32–36)
MCV RBC AUTO: 93.2 FL — SIGNIFICANT CHANGE UP (ref 80–100)
MONOCYTES # BLD AUTO: 0.53 K/UL — SIGNIFICANT CHANGE UP (ref 0–0.9)
MONOCYTES NFR BLD AUTO: 4.9 % — SIGNIFICANT CHANGE UP (ref 2–14)
NEUTROPHILS # BLD AUTO: 9.42 K/UL — HIGH (ref 1.8–7.4)
NEUTROPHILS NFR BLD AUTO: 86.3 % — HIGH (ref 43–77)
NRBC # BLD: 0 /100 WBCS — SIGNIFICANT CHANGE UP (ref 0–0)
PHOSPHATE SERPL-MCNC: 2.4 MG/DL — LOW (ref 2.5–4.5)
PLATELET # BLD AUTO: 190 K/UL — SIGNIFICANT CHANGE UP (ref 150–400)
POTASSIUM SERPL-MCNC: 4.2 MMOL/L — SIGNIFICANT CHANGE UP (ref 3.5–5.3)
POTASSIUM SERPL-SCNC: 4.2 MMOL/L — SIGNIFICANT CHANGE UP (ref 3.5–5.3)
PROT SERPL-MCNC: 6.3 G/DL — SIGNIFICANT CHANGE UP (ref 6–8.3)
RBC # BLD: 4.09 M/UL — SIGNIFICANT CHANGE UP (ref 3.8–5.2)
RBC # FLD: 11.9 % — SIGNIFICANT CHANGE UP (ref 10.3–14.5)
SODIUM SERPL-SCNC: 141 MMOL/L — SIGNIFICANT CHANGE UP (ref 135–145)
SURGICAL PATHOLOGY STUDY: SIGNIFICANT CHANGE UP
WBC # BLD: 10.91 K/UL — HIGH (ref 3.8–10.5)
WBC # FLD AUTO: 10.91 K/UL — HIGH (ref 3.8–10.5)

## 2020-05-15 PROCEDURE — 70450 CT HEAD/BRAIN W/O DYE: CPT

## 2020-05-15 PROCEDURE — 70450 CT HEAD/BRAIN W/O DYE: CPT | Mod: 26

## 2020-05-15 PROCEDURE — 85025 COMPLETE CBC W/AUTO DIFF WBC: CPT

## 2020-05-15 PROCEDURE — 97161 PT EVAL LOW COMPLEX 20 MIN: CPT

## 2020-05-15 PROCEDURE — 88333 PATH CONSLTJ SURG CYTO XM 1: CPT

## 2020-05-15 PROCEDURE — 88360 TUMOR IMMUNOHISTOCHEM/MANUAL: CPT

## 2020-05-15 PROCEDURE — 88307 TISSUE EXAM BY PATHOLOGIST: CPT

## 2020-05-15 PROCEDURE — 36415 COLL VENOUS BLD VENIPUNCTURE: CPT

## 2020-05-15 PROCEDURE — 83735 ASSAY OF MAGNESIUM: CPT

## 2020-05-15 PROCEDURE — 83935 ASSAY OF URINE OSMOLALITY: CPT

## 2020-05-15 PROCEDURE — 82962 GLUCOSE BLOOD TEST: CPT

## 2020-05-15 PROCEDURE — 84300 ASSAY OF URINE SODIUM: CPT

## 2020-05-15 PROCEDURE — 97116 GAIT TRAINING THERAPY: CPT

## 2020-05-15 PROCEDURE — 88342 IMHCHEM/IMCYTCHM 1ST ANTB: CPT

## 2020-05-15 PROCEDURE — 81003 URINALYSIS AUTO W/O SCOPE: CPT

## 2020-05-15 PROCEDURE — 88341 IMHCHEM/IMCYTCHM EA ADD ANTB: CPT

## 2020-05-15 PROCEDURE — 70552 MRI BRAIN STEM W/DYE: CPT

## 2020-05-15 PROCEDURE — 85027 COMPLETE CBC AUTOMATED: CPT

## 2020-05-15 PROCEDURE — 83930 ASSAY OF BLOOD OSMOLALITY: CPT

## 2020-05-15 PROCEDURE — C1713: CPT

## 2020-05-15 PROCEDURE — 83036 HEMOGLOBIN GLYCOSYLATED A1C: CPT

## 2020-05-15 PROCEDURE — C1889: CPT

## 2020-05-15 PROCEDURE — 86901 BLOOD TYPING SEROLOGIC RH(D): CPT

## 2020-05-15 PROCEDURE — 70553 MRI BRAIN STEM W/O & W/DYE: CPT

## 2020-05-15 PROCEDURE — 86850 RBC ANTIBODY SCREEN: CPT

## 2020-05-15 PROCEDURE — 81001 URINALYSIS AUTO W/SCOPE: CPT

## 2020-05-15 PROCEDURE — A9585: CPT

## 2020-05-15 PROCEDURE — 88331 PATH CONSLTJ SURG 1 BLK 1SPC: CPT

## 2020-05-15 PROCEDURE — 80048 BASIC METABOLIC PNL TOTAL CA: CPT

## 2020-05-15 PROCEDURE — 80053 COMPREHEN METABOLIC PANEL: CPT

## 2020-05-15 PROCEDURE — 84100 ASSAY OF PHOSPHORUS: CPT

## 2020-05-15 PROCEDURE — 99239 HOSP IP/OBS DSCHRG MGMT >30: CPT

## 2020-05-15 RX ORDER — LEVETIRACETAM 250 MG/1
1 TABLET, FILM COATED ORAL
Qty: 60 | Refills: 0
Start: 2020-05-15 | End: 2020-06-13

## 2020-05-15 RX ORDER — ACETAMINOPHEN 500 MG
2 TABLET ORAL
Qty: 0 | Refills: 0 | DISCHARGE
Start: 2020-05-15

## 2020-05-15 RX ORDER — DEXAMETHASONE 0.5 MG/5ML
2 ELIXIR ORAL
Qty: 180 | Refills: 0
Start: 2020-05-15 | End: 2020-06-13

## 2020-05-15 RX ORDER — SODIUM,POTASSIUM PHOSPHATES 278-250MG
1 POWDER IN PACKET (EA) ORAL ONCE
Refills: 0 | Status: COMPLETED | OUTPATIENT
Start: 2020-05-15 | End: 2020-05-15

## 2020-05-15 RX ORDER — PANTOPRAZOLE SODIUM 20 MG/1
1 TABLET, DELAYED RELEASE ORAL
Qty: 30 | Refills: 0
Start: 2020-05-15 | End: 2020-06-13

## 2020-05-15 RX ADMIN — LEVETIRACETAM 500 MILLIGRAM(S): 250 TABLET, FILM COATED ORAL at 06:11

## 2020-05-15 RX ADMIN — Medication 4 MILLIGRAM(S): at 06:11

## 2020-05-15 RX ADMIN — LEVETIRACETAM 500 MILLIGRAM(S): 250 TABLET, FILM COATED ORAL at 17:11

## 2020-05-15 RX ADMIN — Medication 4 MILLIGRAM(S): at 11:56

## 2020-05-15 RX ADMIN — Medication 4 MILLIGRAM(S): at 17:11

## 2020-05-15 RX ADMIN — Medication 1 TABLET(S): at 11:56

## 2020-05-15 RX ADMIN — Medication 1 PACKET(S): at 09:11

## 2020-05-15 RX ADMIN — DEXTROSE MONOHYDRATE, SODIUM CHLORIDE, AND POTASSIUM CHLORIDE 100 MILLILITER(S): 50; .745; 4.5 INJECTION, SOLUTION INTRAVENOUS at 06:13

## 2020-05-15 RX ADMIN — PANTOPRAZOLE SODIUM 40 MILLIGRAM(S): 20 TABLET, DELAYED RELEASE ORAL at 06:11

## 2020-05-15 NOTE — DISCHARGE NOTE PROVIDER - NSDCACTIVITY_GEN_ALL_CORE
Showering allowed/Do not drive or operate machinery/Do not make important decisions/Walking - Indoors allowed/Stairs allowed/Walking - Outdoors allowed/No heavy lifting/straining

## 2020-05-15 NOTE — PROGRESS NOTE ADULT - ATTENDING COMMENTS
Await path  steriod taper  PT
ATTENDING ATTESTATION:    I was physically present for the key portions of the evaluation and management (E/M) service provided.  I agree with the above history, physical and plan, which I have reviewed and edited where appropriate.    Patient at high risk for neurological deterioration or death due to: ICU delirium.    Critical care time:  I have personally provided 60 minutes of critical care time, excluding time spent on separately-billable procedures.    Plan discussed with multidisciplinary staff and attendings.

## 2020-05-15 NOTE — DISCHARGE NOTE PROVIDER - HOSPITAL COURSE
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        She presents now 5/13/20 for right craniotomy resection of brain tumor. (13 May 2020 16:04)        Hospital Course:     5/14 POD#1 s/p right crani and tumor resection with EVD @ 10Cm H2O, post-op Left homon hemianop, DeC/Keppra,  HCT post-op reviewed, pending MRI post-op, SQL start today DVT proph    5/15 POD#2 AMIRA overnight, Neuro exam stable, EVD clamped as of 5/14 10AM, MRI post-op completed, Decadron/Keppra. EVD removed. Exit CTH performed (stable.)        Patient worked with PT and is cleared for discharge home. Patient medically optimized for discharge now.

## 2020-05-15 NOTE — DISCHARGE NOTE PROVIDER - NSDCCPTREATMENT_GEN_ALL_CORE_FT
PRINCIPAL PROCEDURE  Procedure: Craniotomy for brain tumor using navigation system  Findings and Treatment:

## 2020-05-15 NOTE — PROGRESS NOTE ADULT - SUBJECTIVE AND OBJECTIVE BOX
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 She was seen originally at [facility] for evaluation of headaches, and signs of blurry vision.    She presents now 19 for right craniotomy resection of brain tumor. (13 May 2020 16:04)    Hospital Course:    POD#1 s/p right crani and tumor resection with EVD @ 10Cm H2O, post-op Left homon hemianop, DeC/Keppra,  HCT post-op reviewed, pending MRI post-op, SQL start today DVT proph  5/15 POD#2 AMIRA overnight, Neuro exam stable, EVD clamped as of  10AM, MRI post-op completed, Decadron/Keppra    ICU Vital Signs Last 24 Hrs  T(C): 36.8 (14 May 2020 21:02), Max: 37.6 (14 May 2020 16:00)  T(F): 98.3 (14 May 2020 21:02), Max: 99.7 (14 May 2020 16:00)  HR: 63 (14 May 2020 20:00) (53 - 70)  BP: 130/72 (14 May 2020 20:00) (130/72 - 170/87)  BP(mean): 96 (14 May 2020 20:00) (92 - 121)  ABP: 152/77 (14 May 2020 20:00) (140/67 - 172/88)  ABP(mean): 106 (14 May 2020 20:00) (96 - 117)  RR: 18 (14 May 2020 20:00) (12 - 31)  SpO2: 99% (14 May 2020 20:00) (98% - 100%)      PHYSICAL EXAM:  Neurological:  Awake, alert, oriented x 3, coherent speech, PERRL, EOMI  tongue midline  face symmetric  Left homon hemianop  Follows commands, speech clear  BARRAGAN X4 with good strength   head dressing: C/D/I    Incision/Wound: c/d/i    DEVICE/DRAIN DRESSING:    TUBES/LINES:  [] CVC  [x] A-line  [] Lumbar Drain  [x] Ventriculostomy @10cm H2O  [] Other    DIET:  [] NPO  [x] Mechanical  [] Tube feeds    LABS:                        Pending AM Collection      Urinalysis Basic - ( 14 May 2020 00:22 )    Color: Yellow / Appearance: Clear / S.020 / pH: x  Gluc: x / Ketone: NEGATIVE  / Bili: Negative / Urobili: 0.2 E.U./dL   Blood: x / Protein: NEGATIVE mg/dL / Nitrite: NEGATIVE   Leuk Esterase: NEGATIVE / RBC: 5-10 /HPF / WBC < 5 /HPF   Sq Epi: x / Non Sq Epi: 0-5 /HPF / Bacteria: Present /HPF          CAPILLARY BLOOD GLUCOSE      POCT Blood Glucose.: 134 mg/dL (13 May 2020 21:17)  POCT Blood Glucose.: 122 mg/dL (13 May 2020 16:26)      Drug Levels: [] N/A    CSF Analysis: [] N/A      Allergies    sulfa drugs (Other)    Intolerances      MEDICATIONS:  Antibiotics:  ceFAZolin   IVPB 2000 milliGRAM(s) IV Intermittent every 8 hours    Neuro:  HYDROmorphone  Injectable 0.5 milliGRAM(s) IV Push every 4 hours PRN  levETIRAcetam 500 milliGRAM(s) Oral two times a day  oxycodone    5 mG/acetaminophen 325 mG 1 Tablet(s) Oral every 4 hours PRN  oxycodone    5 mG/acetaminophen 325 mG 2 Tablet(s) Oral every 6 hours PRN    Anticoagulation:    OTHER:  dexAMETHasone     Tablet 4 milliGRAM(s) Oral every 6 hours  dextrose 40% Gel 15 Gram(s) Oral once PRN  dextrose 50% Injectable 12.5 Gram(s) IV Push once  dextrose 50% Injectable 25 Gram(s) IV Push once  dextrose 50% Injectable 25 Gram(s) IV Push once  glucagon  Injectable 1 milliGRAM(s) IntraMuscular once PRN  insulin lispro (HumaLOG) corrective regimen sliding scale   SubCutaneous Before meals and at bedtime  pantoprazole    Tablet 40 milliGRAM(s) Oral before breakfast    IVF:  dextrose 5%. 1000 milliLiter(s) IV Continuous <Continuous>  multivitamin 1 Tablet(s) Oral daily  sodium chloride 0.9% with potassium chloride 20 mEq/L 1000 milliLiter(s) IV Continuous <Continuous>    CULTURES:    RADIOLOGY & ADDITIONAL TESTS:      ASSESSMENT:  40y Female  POD#2 () s/p right crani and tumor resection with EVD     C71.3 G93.0  Handoff  Brain tumor  Brain tumor  Craniotomy for brain tumor using navigation system      PLAN:  NEURO:  Keppra 500q12  Decadron 4q6, taper over 1 week  EVD clamped as of  10AM, transduce ICP every 1 hour  MRI brain post-op = completed    CARDIOVASCULAR:  SBP<140     PULMONARY:  IS      RENAL:  IVL    GI:  Bowel Regimen  Reg Diet    HEME:  trend H/H    ID:  monitor for fevers  trend WBC    ENDO:  ISS    DVT PROPHYLAXIS:  [x] Venodynes                                [] Heparin/Lovenox    FALL RISK:  [] Low Risk                                    [] Impulsive    DISPOSITION:   ICU Status  Full Code  PT/OT eval pending

## 2020-05-15 NOTE — DISCHARGE NOTE PROVIDER - NSDCMRMEDTOKEN_GEN_ALL_CORE_FT
acetaminophen 325 mg oral tablet: 2 tab(s) orally every 6 hours, As needed, Temp greater or equal to 38C (100.4F), Mild Pain (1 - 3)  dexamethasone 2 mg oral tablet: 4mg q6hrs x 1 day, then 4mg q8hrs x 2 days, then continue at 2mg q8hrs   levETIRAcetam 500 mg oral tablet: 1 tab(s) orally 2 times a day  pantoprazole 40 mg oral delayed release tablet: 1 tab(s) orally once a day (before a meal)

## 2020-05-15 NOTE — PROGRESS NOTE ADULT - SUBJECTIVE AND OBJECTIVE BOX
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 She was seen originally at [facility] for evaluation of headaches, and signs of blurry vision.    She presents now 19 for right craniotomy resection of brain tumor. (13 May 2020 16:04)    Hospital Course:    POD#1 s/p right crani and tumor resection with EVD @ 10Cm H2O, post-op Left homon hemianop, DeC/Keppra,  HCT post-op reviewed, pending MRI post-op, SQL start today DVT proph  5/15 POD#2 AMIRA overnight, Neuro exam stable, EVD clamped as of  10AM, MRI post-op completed, Decadron/Keppra    ICU Vital Signs Last 24 Hrs  T(C): 36.8 (14 May 2020 21:02), Max: 37.6 (14 May 2020 16:00)  T(F): 98.3 (14 May 2020 21:02), Max: 99.7 (14 May 2020 16:00)  HR: 63 (14 May 2020 20:00) (53 - 70)  BP: 130/72 (14 May 2020 20:00) (130/72 - 170/87)  BP(mean): 96 (14 May 2020 20:00) (92 - 121)  ABP: 152/77 (14 May 2020 20:00) (140/67 - 172/88)      G:      LABS:                        Pending AM Collection      Urinalysis Basic - ( 14 May 2020 00:22 )    Color: Yellow / Appearance: Clear / S.020 / pH: x  Gluc: x / Ketone: NEGATIVE  / Bili: Negative / Urobili: 0.2 E.U./dL   Blood: x / Protein: NEGATIVE mg/dL / Nitrite: NEGATIVE   Leuk Esterase: NEGATIVE / RBC: 5-10 /HPF / WBC < 5 /HPF   Sq Epi: x / Non Sq Epi: 0-5 /HPF / Bacteria: Present /HPF          CAPILLARY BLOOD GLUCOSE      POCT Blood Glucose.: 134 mg/dL (13 May 2020 21:17)  POCT Blood Glucose.: 122 mg/dL (13 May 2020 16:26)    A        sulfa drugs (Other)      MEDICATIONS:  Antibiotics:  ceFAZolin   IVPB 2000 milliGRAM(s) IV Intermittent every 8 hours    Neuro:  HYDROmorphone  Injectable 0.5 milliGRAM(s) IV Push every 4 hours PRN  levETIRAcetam 500 milliGRAM(s) Oral two times a day  oxycodone    5 mG/acetaminophen 325 mG 1 Tablet(s) Oral every 4 hours PRN  oxycodone    5 mG/acetaminophen 325 mG 2 Tablet(s) Oral every 6 hours PRN    Anticoagulation:    OTHER:  dexAMETHasone     Tablet 4 milliGRAM(s) Oral every 6 hours  dextrose 40% Gel 15 Gram(s) Oral once PRN  dextrose 50% Injectable 12.5 Gram(s) IV Push once  dextrose 50% Injectable 25 Gram(s) IV Push once  dextrose 50% Injectable 25 Gram(s) IV Push once  glucagon  Injectable 1 milliGRAM(s) IntraMuscular once PRN  insulin lispro (HumaLOG) corrective regimen sliding scale   SubCutaneous Before meals and at bedtime  pantoprazole    Tablet 40 milliGRAM(s) Oral before breakfast    IVF:  dextrose 5%. 1000 milliLiter(s) IV Continuous <Continuous>  multivitamin 1 Tablet(s) Oral daily  sodium chloride 0.9% with potassium chloride 20 mEq/L 1000 milliLiter(s) IV Continuous <Continuous>    CULTURES:    RADIOLOGY & ADDITIONAL TESTS:      ASSESSMENT:  40y Female  POD#2 () s/p right crani and tumor resection with EVD       Brain tumor        PLAN:  NEURO:  Keppra 500q12  Decadron 4q6, taper over 1 week  EVD clamped as of  10AM  MRI brain post-op = completed    CARDIOVASCULAR:  SBP<140     PULMONARY:  IS      RENAL:  IVL    GI:  Bowel Regimen  Reg Diet    HEME:  trend H/H    ID:  monitor for fevers  trend WBC    ENDO:  ISS    DVT PROPHYLAXIS:  [x] Venodynes                                [] Heparin/Lovenox    FALL RISK:  [] Low Risk                                    [] Impulsive    DISPOSITION:   ICU Status  Full Code  PT/OT eval pending

## 2020-05-15 NOTE — PROGRESS NOTE ADULT - SUBJECTIVE AND OBJECTIVE BOX
==============================================    NEUROCRITICAL CARE ATTENDING NOTE (Friday, May, 15, 2020)  ==============================================    NAME:  [AYLA CARDONA]  MRN:  [[id number]]    41 yo/F post-operative resection of right parietal glioma (frozen section:  low grade), with presentation history of headaches and blurry vision (current stable L HHA).      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 [facility] evaluation of headaches, and signs of blurry vision.    Hospital Course:    POD#1 s/p right crani and tumor resection with EVD @ 10Cm H2O, post-op Left homon hemianop, DeC/Keppra,  HCT post-op reviewed, pending MRI post-op, SQL start today DVT proph  5/15 POD#2 AMIRA overnight, neuro exam stable, tolerated EVD clamping, resolved post-operative diuresis.  MRI completed with results below.    Past Medical History:  nil significant  Allergies:  sulfa drugs (Other)  Home Meds:      Current Meds:  MEDICATIONS  (STANDING):  bisacodyl 5 milliGRAM(s) Oral at bedtime   dexAMETHasone     Tablet 4 milliGRAM(s) Oral every 6 hours  insulin lispro (HumaLOG) corrective regimen sliding scale   SubCutaneous Before meals and at bedtime  levETIRAcetam 500 milliGRAM(s) Oral two times a day  multivitamin 1 Tablet(s) Oral daily  pantoprazole    Tablet 40 milliGRAM(s) Oral before breakfast  senna 2 Tablet(s) Oral at bedtime    MEDICATIONS  (PRN):  acetaminophen   Tablet .. 650 milliGRAM(s) Oral every 6 hours PRN Temp greater or equal to 38C (100.4F), Mild Pain (1 - 3)  HYDROmorphone  Injectable 0.5 milliGRAM(s) IV Push every 4 hours PRN break through pain  oxycodone    5 mG/acetaminophen 325 mG 1 Tablet(s) Oral every 4 hours PRN Moderate Pain (4 - 6)  oxycodone    5 mG/acetaminophen 325 mG 2 Tablet(s) Oral every 6 hours PRN Severe Pain (7 - 10)    PHYSICAL EXAMINATION  ICU Vital Signs Last 24 Hrs  T(C): 36.9 (15 May 2020 13:00), Max: 37.6 (14 May 2020 16:00)  T(F): 98.4 (15 May 2020 13:00), Max: 99.7 (14 May 2020 16:00)  HR: 82 (15 May 2020 13:30) (51 - 83)  BP: 122/83 (15 May 2020 13:30) (122/83 - 170/87)  BP(mean): 99 (15 May 2020 13:30) (92 - 121)  ABP: 162/149 (15 May 2020 11:00) (122/85 - 172/88)  ABP(mean): 154 (15 May 2020 11:00) (95 - 154)  RR: 18 (15 May 2020 13:30) (15 - 27)  SpO2: 94% (15 May 2020 13:30) (94% - 100%)    NEUROLOGICAL EXAMINATION:  -awake, alert, oriented, cognition and judgment fair, pupils 3 mm reactive bilaterally, EOM full, left HHA, face symmetric, no pronator drift, no [place], intact left-right transfer, obeyed 2- to 3-step commands, full strength UE/LE, no sensory inattention, downgoing plantars  CARDIOVASCULAR:  S1S2 noS3S4 noM  PULMONARY:  clear to bases  ABDOMEN:  soft, non-tender, intact BS  EXTREMITIES:  warm, PPP, no ankle edema  SKIN:  no cutaneous manifestations    EVD to be removed    I/O's    20 @ 07:01  -  05-15-20 @ 07:00  --------------------------------------------------------  IN: 4132 mL / OUT: 3942 mL / NET: 190 mL    05-15-20 @ 07:01  -  05-15-20 @ 13:37  --------------------------------------------------------  IN: 300 mL / OUT: 550 mL / NET: -250 mL    LABS & INVESTIGATIONS               12.4   10.91 )-----------( 190      ( 15 May 2020 07:09 )             38.1     -15    141  |  106  |  13  ----------------------------<  120<H>  4.2   |  21<L>  |  0.43<L>    Ca    8.6      15 May 2020 07:09  Phos  2.4     05-15  Mg     2.0     05-15    TPro  6.3  /  Alb  3.8  /  TBili  0.5  /  DBili  x   /  AST  12  /  ALT  8<L>  /  AlkPhos  39<L>  05-15    Urinalysis Basic - ( 14 May 2020 11:11 )  Color: Yellow / Appearance: Clear / S.020 / pH: x  Gluc: x / Ketone: Trace mg/dL  / Bili: Negative / Urobili: 0.2 E.U./dL   Blood: x / Protein: NEGATIVE mg/dL / Nitrite: NEGATIVE   Leuk Esterase: NEGATIVE / RBC: x / WBC x   Sq Epi: x / Non Sq Epi: x / Bacteria: x    CAPILLARY BLOOD GLUCOSE    POCT Blood Glucose.: 114 mg/dL (15 May 2020 11:55)  POCT Blood Glucose.: 111 mg/dL (15 May 2020 06:24)  POCT Blood Glucose.: 125 mg/dL (14 May 2020 23:09)  POCT Blood Glucose.: 132 mg/dL (14 May 2020 16:45)    MRI 2020 - Changes reflecting right parietal craniotomy and resection necrotic mass. Thick residual enhancement along the superior margin of the operative bed and additional rim enhancing focus within the right inferomedial temporal lobe are worrisome for residual tumor.    MRI 2020 - Approximately 4.9 x 4.1 x 3.3 cm peripherally enhancing lesion within the paramedian right parietal lobe with extension into the splenium of the corpus callosum. Second smaller separate 1.0 cm enhancing lesion within the right parahippocampal region. These findings may represent multifocal glioma. Images are available surgical planning.    CODE STATUS:  [Full]  GOALS OF CARE:  [Aggressive]  DISPOSITION:  [ICU]      ATTENDING ATTESTATION:  I was physically present for the key portions of the evaluation and management (E/M) service provided.  I agree with the above history, physical and plan, which I have reviewed and edited where appropriate.    Patient at high risk for neurological deterioriation or death due to:  _____.  Critical Care Time:  I have personally provided ___ minutes of critical care time excluding time spent on separate procedures. ==============================================    NEUROCRITICAL CARE ATTENDING NOTE (Friday, May, 15, 2020)  ==============================================    NAME:  [AYLA CARDONA]  MRN:  [[id number]]    39 yo/F post-operative resection of right parietal glioma (frozen section:  low grade), with presentation history of headaches and blurry vision (current stable L HHA).      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 for evaluation of headaches, and signs of blurry vision.    Hospital Course:    POD#1 s/p right crani and tumor resection with EVD @ 10Cm H2O, post-op Left homon hemianop, DeC/Keppra,  HCT post-op reviewed, pending MRI post-op, SQL start today DVT proph  5/15 POD#2 AMIRA overnight, neuro exam stable, tolerated EVD clamping, resolved post-operative diuresis.  MRI completed with results below.    Past Medical History:  nil significant  Allergies:  sulfa drugs (Other)  Home Meds:      Current Meds:  MEDICATIONS  (STANDING):  bisacodyl 5 milliGRAM(s) Oral at bedtime   dexAMETHasone     Tablet 4 milliGRAM(s) Oral every 6 hours  insulin lispro (HumaLOG) corrective regimen sliding scale   SubCutaneous Before meals and at bedtime  levETIRAcetam 500 milliGRAM(s) Oral two times a day  multivitamin 1 Tablet(s) Oral daily  pantoprazole    Tablet 40 milliGRAM(s) Oral before breakfast  senna 2 Tablet(s) Oral at bedtime    MEDICATIONS  (PRN):  acetaminophen   Tablet .. 650 milliGRAM(s) Oral every 6 hours PRN Temp greater or equal to 38C (100.4F), Mild Pain (1 - 3)  HYDROmorphone  Injectable 0.5 milliGRAM(s) IV Push every 4 hours PRN break through pain  oxycodone    5 mG/acetaminophen 325 mG 1 Tablet(s) Oral every 4 hours PRN Moderate Pain (4 - 6)  oxycodone    5 mG/acetaminophen 325 mG 2 Tablet(s) Oral every 6 hours PRN Severe Pain (7 - 10)    PHYSICAL EXAMINATION  ICU Vital Signs Last 24 Hrs  T(C): 36.9 (15 May 2020 13:00), Max: 37.6 (14 May 2020 16:00)  T(F): 98.4 (15 May 2020 13:00), Max: 99.7 (14 May 2020 16:00)  HR: 82 (15 May 2020 13:30) (51 - 83)  BP: 122/83 (15 May 2020 13:30) (122/83 - 170/87)  BP(mean): 99 (15 May 2020 13:30) (92 - 121)  ABP: 162/149 (15 May 2020 11:00) (122/85 - 172/88)  ABP(mean): 154 (15 May 2020 11:00) (95 - 154)  RR: 18 (15 May 2020 13:30) (15 - 27)  SpO2: 94% (15 May 2020 13:30) (94% - 100%)    NEUROLOGICAL EXAMINATION:  -awake, alert, oriented, cognition and judgment fair, pupils 3 mm reactive bilaterally, EOM full, left HHA, face symmetric, no pronator drift, no [place], intact left-right transfer, obeyed 2- to 3-step commands, full strength UE/LE, no sensory inattention, downgoing plantars  CARDIOVASCULAR:  S1S2 noS3S4 noM  PULMONARY:  clear to bases  ABDOMEN:  soft, non-tender, intact BS  EXTREMITIES:  warm, PPP, no ankle edema  SKIN:  no cutaneous manifestations    EVD to be removed    I/O's    20 @ 07:01  -  05-15-20 @ 07:00  --------------------------------------------------------  IN: 4132 mL / OUT: 3942 mL / NET: 190 mL    05-15-20 @ 07:01  -  05-15-20 @ 13:37  --------------------------------------------------------  IN: 300 mL / OUT: 550 mL / NET: -250 mL    LABS & INVESTIGATIONS               12.4   10.91 )-----------( 190      ( 15 May 2020 07:09 )             38.1     -15    141  |  106  |  13  ----------------------------<  120<H>  4.2   |  21<L>  |  0.43<L>    Ca    8.6      15 May 2020 07:09  Phos  2.4     05-15  Mg     2.0     05-15    TPro  6.3  /  Alb  3.8  /  TBili  0.5  /  DBili  x   /  AST  12  /  ALT  8<L>  /  AlkPhos  39<L>  05-15    Urinalysis Basic - ( 14 May 2020 11:11 )  Color: Yellow / Appearance: Clear / S.020 / pH: x  Gluc: x / Ketone: Trace mg/dL  / Bili: Negative / Urobili: 0.2 E.U./dL   Blood: x / Protein: NEGATIVE mg/dL / Nitrite: NEGATIVE   Leuk Esterase: NEGATIVE / RBC: x / WBC x   Sq Epi: x / Non Sq Epi: x / Bacteria: x    CAPILLARY BLOOD GLUCOSE    POCT Blood Glucose.: 114 mg/dL (15 May 2020 11:55)  POCT Blood Glucose.: 111 mg/dL (15 May 2020 06:24)  POCT Blood Glucose.: 125 mg/dL (14 May 2020 23:09)  POCT Blood Glucose.: 132 mg/dL (14 May 2020 16:45)    MRI 2020 - Changes reflecting right parietal craniotomy and resection necrotic mass. Thick residual enhancement along the superior margin of the operative bed and additional rim enhancing focus within the right inferomedial temporal lobe are worrisome for residual tumor.    MRI 2020 - Approximately 4.9 x 4.1 x 3.3 cm peripherally enhancing lesion within the paramedian right parietal lobe with extension into the splenium of the corpus callosum. Second smaller separate 1.0 cm enhancing lesion within the right parahippocampal region. These findings may represent multifocal glioma. Images are available surgical planning.    CODE STATUS:  [Full]  GOALS OF CARE:  [Aggressive]  DISPOSITION:  [ICU]

## 2020-05-15 NOTE — DISCHARGE NOTE PROVIDER - CARE PROVIDER_API CALL
Arpit Montes De Oca  NEUROSURGERY  [address] [place] [address]  Phone: [phone]  Fax: [phone]  Follow Up Time:     Tolu Malloy  OPHTHALMOLOGY  [address] [place] [address]  Phone: [phone]  Fax: [phone]  Follow Up Time:

## 2020-05-15 NOTE — DISCHARGE NOTE NURSING/CASE MANAGEMENT/SOCIAL WORK - PATIENT PORTAL LINK FT
You can access the FollowMyHealth Patient Portal offered by [facility] by registering at the following website: [email or web address]. By joining [employer]’s FollowMyHealth portal, you will also be able to view your health information using other applications (apps) compatible with our system.

## 2020-05-15 NOTE — PROGRESS NOTE ADULT - ASSESSMENT
ASSESSMENT & PLAN    ASSESSMENT    PLAN -     NEURO -   -neurologically well  -on decadron slow taper  -keppra for seizure prophylaxis  -PT, assessment for discharge    CV -  -stable    PUL -  -encouraged incentive spirometry    ENDO -  -euglycemic    GI/ -  -resolved postoperative diuresis    DIET -   -encouraged PO diet    HEM -  -Hb 12.4    VTE Prophylaxis -  -SCDs, AAT    ID -   -perioperative antibiotics    Social -  -patient updated    *****    Quality Measures    FAST HUG checked    *****

## 2020-05-15 NOTE — DISCHARGE NOTE PROVIDER - NSDCFUADDAPPT_GEN_ALL_CORE_FT
1. You may wash your hair. Use your normal shampoo. Be gentle around the incision. Carefully pat dry incision.   2. Mild swelling around the incision is common. Keep incision open to air and dry.  4. Inform the doctor immediately if you have a fever (above 101), chills, night  sweats, wound drainage, increasing wound redness or pain, nausea, vomiting, or  worsening headache.  B. ACTIVITY LEVEL  1. Fatigue is common following brain surgery, rest if you are tired.  2. You should get up and walk around every hour during the daytime.  3. No bending, lifting or twisting for the first 3 weeks, but walking is  recommended.  4. Drink plenty of water, stay out of the sun.  You may be given a narcotic pain reliever such as Percocet  (oxycodone-acetaminophen). This is for short term use. Avoid use of alcohol when taking these meds.

## 2020-05-15 NOTE — DISCHARGE NOTE PROVIDER - NSDCFUADDINST_GEN_ALL_CORE_FT
Call Dr. Montes De Oca's office to schedule a follow up appointment for wound check.  Continue Keppra 500mg bid.  Continue Decadron taper (as directed), then continue to take 2mg 3x a day until follow up appointment.

## 2020-06-10 ENCOUNTER — APPOINTMENT (OUTPATIENT)
Dept: MRI IMAGING | Facility: HOSPITAL | Age: 40
End: 2020-06-10

## 2020-06-10 ENCOUNTER — OUTPATIENT (OUTPATIENT)
Dept: OUTPATIENT SERVICES | Facility: HOSPITAL | Age: 40
LOS: 1 days | End: 2020-06-10
Payer: COMMERCIAL

## 2020-06-10 PROCEDURE — 70553 MRI BRAIN STEM W/O & W/DYE: CPT | Mod: 26

## 2020-06-10 PROCEDURE — A9585: CPT

## 2020-06-10 PROCEDURE — 70553 MRI BRAIN STEM W/O & W/DYE: CPT

## 2020-06-16 ENCOUNTER — APPOINTMENT (OUTPATIENT)
Dept: NEUROLOGY | Facility: CLINIC | Age: 40
End: 2020-06-16
Payer: COMMERCIAL

## 2020-06-16 VITALS — TEMPERATURE: 97.9 F | HEIGHT: 64 IN | RESPIRATION RATE: 18 BRPM | WEIGHT: 150 LBS | BODY MASS INDEX: 25.61 KG/M2

## 2020-06-16 VITALS — SYSTOLIC BLOOD PRESSURE: 115 MMHG | HEART RATE: 51 BPM | DIASTOLIC BLOOD PRESSURE: 78 MMHG

## 2020-06-16 DIAGNOSIS — Z82.49 FAMILY HISTORY OF ISCHEMIC HEART DISEASE AND OTHER DISEASES OF THE CIRCULATORY SYSTEM: ICD-10-CM

## 2020-06-16 DIAGNOSIS — Z83.3 FAMILY HISTORY OF DIABETES MELLITUS: ICD-10-CM

## 2020-06-16 PROCEDURE — 99205 OFFICE O/P NEW HI 60 MIN: CPT

## 2020-06-16 NOTE — PHYSICAL EXAM
[FreeTextEntry1] : Patient is awake and alert - oriented and fluent - irritable but appropriate.\par PERRL, EOMI,\par Left VF defect - LQ\par No sensory or visual neglect to exam\par No drift\par FFM are equal bilaterally\par Strength is full throughout\par Mild unsteadiness with gait (in heels - veers slightly to the left).

## 2020-06-16 NOTE — DISCUSSION/SUMMARY
[FreeTextEntry1] : In summary, this is a 39 yo woman with a newly diagnosed GBM - 2 foci - dominant left parietal and smaller left hippocampal. She has started RT /TMZ - MRI too early to show response to treatment but progression is concerning. She is also not taking her medication as prescribed.\par \par I have recommended \par Continue Decadron 2 mg daily for now.\par Restart Keppra 500 mg bid for seizure prophylaxis.\par Add Avastin IV 10 mg/kg q 2 weeks as she has residual tumor burden.\par Take Zofran 30 minutes prior to Temodar nightly to prevent nausea.\par I will need to review blood work and EKG - if patient agrees she can start Avastin next week.\par Follow up next week.

## 2020-06-16 NOTE — HISTORY OF PRESENT ILLNESS
[FreeTextEntry1] : Patient is a 39 yo right handed woman referred by Dr. Trejo for evaluation of brain tumor.\par Per the patient and her  she developed bifrontal headaches in March - initially she attributed these to wearing a mask at the beginning of the pandemic. Her symptoms progressed - she began to get lost when driving and had some confusion with tasks that were very ordinary. Headaches also escalated - most significantly post-exercise - and became unrelieved by Tylenol. Her  brought her to the ER at [facility] in early May where neuroimaging was abnormal and she was transferred to [facility] on May 13, 2020.\par \par I have personally reviewed all of her neuroimaging - she has an MRI brain at [facility] on 5/13 which shows a peripherally enhancing mass in the right medial parietal lobe measuring 4.9 x 4.1 x 3.3 cm. There is also a second area of more solid enhancement - not connected by flair imaging - in the right parahippocampal region measuring about 1 cm. Post-operative MRI scan on 5/14 showed a large right parietal resection cavity with some medial residual enhancement. The right parahippocampal region was unchanged. Patient was able to be discharged home on 5/15/2020. She was discharged on a Decadron taper, Pantoprazole, and Keppra 500 mg bid. There is no history of seizure.\par \par She saw Dr. Chamorro, a radiation oncologist, and started RT with Temodar approximately 10 days ago. She self-reduced her dexamethasone from 2 mg bid to qd and stopped taking her Keppra. She had an increase in headaches on 6/10 which prompted a repeat MRI brain which I have reviewed. This scan shows increased enhancement in the dominant lesion crossing into the left side of the posterior corpus callosum. The parahippocampal lesion is slightly bigger - measuring 1.5 cm vs. 1.0 cm.\par \par She is here for neuro oncologic evaluation. Her Temodar was prescribed by her neurosurgeon.

## 2020-06-20 ENCOUNTER — TRANSCRIPTION ENCOUNTER (OUTPATIENT)
Age: 40
End: 2020-06-20

## 2020-06-22 ENCOUNTER — OUTPATIENT (OUTPATIENT)
Dept: OUTPATIENT SERVICES | Facility: HOSPITAL | Age: 40
LOS: 1 days | Discharge: HOME | End: 2020-06-22

## 2020-06-22 DIAGNOSIS — Z85.841 PERSONAL HISTORY OF MALIGNANT NEOPLASM OF BRAIN: ICD-10-CM

## 2020-06-23 ENCOUNTER — RESULT REVIEW (OUTPATIENT)
Age: 40
End: 2020-06-23

## 2020-06-23 ENCOUNTER — APPOINTMENT (OUTPATIENT)
Dept: INFUSION THERAPY | Facility: HOSPITAL | Age: 40
End: 2020-06-23

## 2020-06-23 ENCOUNTER — LABORATORY RESULT (OUTPATIENT)
Age: 40
End: 2020-06-23

## 2020-06-23 LAB
BASOPHILS # BLD AUTO: 0.02 K/UL — SIGNIFICANT CHANGE UP (ref 0–0.2)
BASOPHILS NFR BLD AUTO: 0.2 % — SIGNIFICANT CHANGE UP (ref 0–2)
EOSINOPHIL # BLD AUTO: 0.01 K/UL — SIGNIFICANT CHANGE UP (ref 0–0.5)
EOSINOPHIL NFR BLD AUTO: 0.1 % — SIGNIFICANT CHANGE UP (ref 0–6)
HCT VFR BLD CALC: 40.3 % — SIGNIFICANT CHANGE UP (ref 34.5–45)
HGB BLD-MCNC: 12.7 G/DL — SIGNIFICANT CHANGE UP (ref 11.5–15.5)
IMM GRANULOCYTES NFR BLD AUTO: 1 % — SIGNIFICANT CHANGE UP (ref 0–1.5)
LYMPHOCYTES # BLD AUTO: 0.57 K/UL — LOW (ref 1–3.3)
LYMPHOCYTES # BLD AUTO: 7.1 % — LOW (ref 13–44)
MCHC RBC-ENTMCNC: 31.4 PG — SIGNIFICANT CHANGE UP (ref 27–34)
MCHC RBC-ENTMCNC: 31.5 GM/DL — LOW (ref 32–36)
MCV RBC AUTO: 99.8 FL — SIGNIFICANT CHANGE UP (ref 80–100)
MONOCYTES # BLD AUTO: 0.31 K/UL — SIGNIFICANT CHANGE UP (ref 0–0.9)
MONOCYTES NFR BLD AUTO: 3.9 % — SIGNIFICANT CHANGE UP (ref 2–14)
NEUTROPHILS # BLD AUTO: 7.05 K/UL — SIGNIFICANT CHANGE UP (ref 1.8–7.4)
NEUTROPHILS NFR BLD AUTO: 87.7 % — HIGH (ref 43–77)
NRBC # BLD: 0 /100 WBCS — SIGNIFICANT CHANGE UP (ref 0–0)
PLATELET # BLD AUTO: 216 K/UL — SIGNIFICANT CHANGE UP (ref 150–400)
RBC # BLD: 4.04 M/UL — SIGNIFICANT CHANGE UP (ref 3.8–5.2)
RBC # FLD: 12.5 % — SIGNIFICANT CHANGE UP (ref 10.3–14.5)
WBC # BLD: 8.04 K/UL — SIGNIFICANT CHANGE UP (ref 3.8–10.5)
WBC # FLD AUTO: 8.04 K/UL — SIGNIFICANT CHANGE UP (ref 3.8–10.5)

## 2020-06-24 DIAGNOSIS — Z51.11 ENCOUNTER FOR ANTINEOPLASTIC CHEMOTHERAPY: ICD-10-CM

## 2020-06-24 DIAGNOSIS — C71.9 MALIGNANT NEOPLASM OF BRAIN, UNSPECIFIED: ICD-10-CM

## 2020-07-07 ENCOUNTER — LABORATORY RESULT (OUTPATIENT)
Age: 40
End: 2020-07-07

## 2020-07-07 ENCOUNTER — RESULT REVIEW (OUTPATIENT)
Age: 40
End: 2020-07-07

## 2020-07-07 ENCOUNTER — APPOINTMENT (OUTPATIENT)
Dept: INFUSION THERAPY | Facility: HOSPITAL | Age: 40
End: 2020-07-07

## 2020-07-07 LAB
BASOPHILS # BLD AUTO: 0.03 K/UL — SIGNIFICANT CHANGE UP (ref 0–0.2)
BASOPHILS NFR BLD AUTO: 0.4 % — SIGNIFICANT CHANGE UP (ref 0–2)
EOSINOPHIL # BLD AUTO: 0.01 K/UL — SIGNIFICANT CHANGE UP (ref 0–0.5)
EOSINOPHIL NFR BLD AUTO: 0.1 % — SIGNIFICANT CHANGE UP (ref 0–6)
HCT VFR BLD CALC: 43.4 % — SIGNIFICANT CHANGE UP (ref 34.5–45)
HGB BLD-MCNC: 14.2 G/DL — SIGNIFICANT CHANGE UP (ref 11.5–15.5)
IMM GRANULOCYTES NFR BLD AUTO: 1.3 % — SIGNIFICANT CHANGE UP (ref 0–1.5)
LYMPHOCYTES # BLD AUTO: 0.38 K/UL — LOW (ref 1–3.3)
LYMPHOCYTES # BLD AUTO: 5.4 % — LOW (ref 13–44)
MCHC RBC-ENTMCNC: 31.8 PG — SIGNIFICANT CHANGE UP (ref 27–34)
MCHC RBC-ENTMCNC: 32.7 GM/DL — SIGNIFICANT CHANGE UP (ref 32–36)
MCV RBC AUTO: 97.3 FL — SIGNIFICANT CHANGE UP (ref 80–100)
MONOCYTES # BLD AUTO: 0.19 K/UL — SIGNIFICANT CHANGE UP (ref 0–0.9)
MONOCYTES NFR BLD AUTO: 2.7 % — SIGNIFICANT CHANGE UP (ref 2–14)
NEUTROPHILS # BLD AUTO: 6.39 K/UL — SIGNIFICANT CHANGE UP (ref 1.8–7.4)
NEUTROPHILS NFR BLD AUTO: 90.1 % — HIGH (ref 43–77)
NRBC # BLD: 0 /100 WBCS — SIGNIFICANT CHANGE UP (ref 0–0)
PLATELET # BLD AUTO: 182 K/UL — SIGNIFICANT CHANGE UP (ref 150–400)
RBC # BLD: 4.46 M/UL — SIGNIFICANT CHANGE UP (ref 3.8–5.2)
RBC # FLD: 11.9 % — SIGNIFICANT CHANGE UP (ref 10.3–14.5)
WBC # BLD: 7.09 K/UL — SIGNIFICANT CHANGE UP (ref 3.8–10.5)
WBC # FLD AUTO: 7.09 K/UL — SIGNIFICANT CHANGE UP (ref 3.8–10.5)

## 2020-07-08 ENCOUNTER — TRANSCRIPTION ENCOUNTER (OUTPATIENT)
Age: 40
End: 2020-07-08

## 2020-07-08 LAB
ALBUMIN SERPL ELPH-MCNC: 4.7 G/DL
ALP BLD-CCNC: 38 U/L
ALT SERPL-CCNC: 32 U/L
ANION GAP SERPL CALC-SCNC: 14 MMOL/L
APPEARANCE: CLEAR
AST SERPL-CCNC: 36 U/L
BILIRUB SERPL-MCNC: 0.3 MG/DL
BILIRUBIN URINE: NEGATIVE
BLOOD URINE: NEGATIVE
BUN SERPL-MCNC: 17 MG/DL
CALCIUM SERPL-MCNC: 9.1 MG/DL
CHLORIDE SERPL-SCNC: 103 MMOL/L
CO2 SERPL-SCNC: 23 MMOL/L
COLOR: NORMAL
CREAT SERPL-MCNC: 0.6 MG/DL
GLUCOSE QUALITATIVE U: NEGATIVE
GLUCOSE SERPL-MCNC: 110 MG/DL
HAV IGM SER QL: NONREACTIVE
HBV CORE IGM SER QL: NONREACTIVE
HBV SURFACE AG SER QL: NONREACTIVE
HCV AB SER QL: NONREACTIVE
HCV S/CO RATIO: 0.06 S/CO
KETONES URINE: NEGATIVE
LEUKOCYTE ESTERASE URINE: NEGATIVE
NITRITE URINE: NEGATIVE
PH URINE: 7
POTASSIUM SERPL-SCNC: 5.1 MMOL/L
PROT SERPL-MCNC: 6.9 G/DL
PROTEIN URINE: NEGATIVE
SODIUM SERPL-SCNC: 140 MMOL/L
SPECIFIC GRAVITY URINE: 1.01
UROBILINOGEN URINE: NORMAL

## 2020-07-14 ENCOUNTER — APPOINTMENT (OUTPATIENT)
Dept: NEUROLOGY | Facility: CLINIC | Age: 40
End: 2020-07-14
Payer: COMMERCIAL

## 2020-07-14 VITALS
SYSTOLIC BLOOD PRESSURE: 128 MMHG | BODY MASS INDEX: 25.61 KG/M2 | TEMPERATURE: 98.2 F | RESPIRATION RATE: 18 BRPM | WEIGHT: 150 LBS | HEIGHT: 64 IN | DIASTOLIC BLOOD PRESSURE: 84 MMHG | HEART RATE: 52 BPM

## 2020-07-14 DIAGNOSIS — Z00.00 ENCOUNTER FOR GENERAL ADULT MEDICAL EXAMINATION W/OUT ABNORMAL FINDINGS: ICD-10-CM

## 2020-07-14 PROCEDURE — 99215 OFFICE O/P EST HI 40 MIN: CPT

## 2020-07-14 NOTE — HISTORY OF PRESENT ILLNESS
[FreeTextEntry1] : Delfina Orona is a 39 yo female who presented at this office for a neuro oncology follow up.\par In brief\par 3/2020 -Developed bifrontal headaches - initially she attributed these to wearing a mask at the beginning of the pandemic. Her symptoms progressed - she began to get lost when driving and had some confusion with tasks that were very ordinary. Headaches also escalated - most significantly post-exercise - and became unrelieved by Tylenol. Her  brought her to the ER at [facility] in early May where neuroimaging was abnormal\par 5/13/2020- Transferred to [facility]. 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 There is also a second area of more solid enhancement - not connected by flair imaging - in the right parahippocampal region measuring about 1 cm. Post-operative MRI scan on 5/14 showed a large right parietal resection cavity with some medial residual enhancement. The right parahippocampal region was unchanged. Patient was able to be discharged home on 5/15/2020. She was discharged on a Decadron taper, Pantoprazole, and Keppra 500 mg bid. There is no history of seizure.\par 6/4/2020- RT started. Chemo was prescribed by Neurosurgeon\par 6/10- Increase in headaches which prompted to repeat the MRI. This scan shows increased enhancement in the dominant lesion crossing into the left side of the posterior corpus callosum. The parahippocampal lesion is slightly bigger - measuring 1.5 cm vs. 1.0 cm.\par 6/16/2020- Saw Dr Diana. REcommended to restart Keppra. REcommended to add IV Avastin to ChemoRT\par 7/14/2020- Patient here for a follow up. Offers no new complaints. RT to end on 7/22/2020. Patient been taking Dexamethasone 2 mg bid\par Denies headaches, seizures

## 2020-07-14 NOTE — PHYSICAL EXAM
[] : no respiratory distress [Respiration, Rhythm And Depth] : normal respiratory rhythm and effort [Exaggerated Use Of Accessory Muscles For Inspiration] : no accessory muscle use [Abnormal Walk] : normal gait [FreeTextEntry1] : \par Patient is awake and alert - oriented and fluent - irritable but appropriate.\par PERRL, EOMI,\par Left VF defect - LQ\par BARRAGAN X4. NO drift noted\par FFM are equal bilaterally\par Strength is full throughout\par Mild unsteadiness with gait, veers mildly to left

## 2020-07-14 NOTE — DISCUSSION/SUMMARY
[FreeTextEntry1] : Patient seen and examined\par Clinically stable\par Continue ChemoRT.\par Will decrease dexamethasone to 2 mg daily.\par Will do a clinical  follow up along with an MRI on August 18, 2020.\par In the interim, if any questions patient knows to call me.

## 2020-07-21 ENCOUNTER — APPOINTMENT (OUTPATIENT)
Dept: INFUSION THERAPY | Facility: HOSPITAL | Age: 40
End: 2020-07-21

## 2020-07-21 ENCOUNTER — RESULT REVIEW (OUTPATIENT)
Age: 40
End: 2020-07-21

## 2020-07-21 ENCOUNTER — LABORATORY RESULT (OUTPATIENT)
Age: 40
End: 2020-07-21

## 2020-07-21 LAB
BASOPHILS # BLD AUTO: 0.07 K/UL — SIGNIFICANT CHANGE UP (ref 0–0.2)
BASOPHILS NFR BLD AUTO: 1.2 % — SIGNIFICANT CHANGE UP (ref 0–2)
EOSINOPHIL # BLD AUTO: 0.08 K/UL — SIGNIFICANT CHANGE UP (ref 0–0.5)
EOSINOPHIL NFR BLD AUTO: 1.4 % — SIGNIFICANT CHANGE UP (ref 0–6)
HCT VFR BLD CALC: 41.6 % — SIGNIFICANT CHANGE UP (ref 34.5–45)
HGB BLD-MCNC: 13.8 G/DL — SIGNIFICANT CHANGE UP (ref 11.5–15.5)
IMM GRANULOCYTES NFR BLD AUTO: 1 % — SIGNIFICANT CHANGE UP (ref 0–1.5)
LYMPHOCYTES # BLD AUTO: 0.85 K/UL — LOW (ref 1–3.3)
LYMPHOCYTES # BLD AUTO: 14.5 % — SIGNIFICANT CHANGE UP (ref 13–44)
MCHC RBC-ENTMCNC: 31.7 PG — SIGNIFICANT CHANGE UP (ref 27–34)
MCHC RBC-ENTMCNC: 33.2 GM/DL — SIGNIFICANT CHANGE UP (ref 32–36)
MCV RBC AUTO: 95.6 FL — SIGNIFICANT CHANGE UP (ref 80–100)
MONOCYTES # BLD AUTO: 0.67 K/UL — SIGNIFICANT CHANGE UP (ref 0–0.9)
MONOCYTES NFR BLD AUTO: 11.5 % — SIGNIFICANT CHANGE UP (ref 2–14)
NEUTROPHILS # BLD AUTO: 4.12 K/UL — SIGNIFICANT CHANGE UP (ref 1.8–7.4)
NEUTROPHILS NFR BLD AUTO: 70.4 % — SIGNIFICANT CHANGE UP (ref 43–77)
NRBC # BLD: 0 /100 WBCS — SIGNIFICANT CHANGE UP (ref 0–0)
PLATELET # BLD AUTO: 169 K/UL — SIGNIFICANT CHANGE UP (ref 150–400)
RBC # BLD: 4.35 M/UL — SIGNIFICANT CHANGE UP (ref 3.8–5.2)
RBC # FLD: 11.6 % — SIGNIFICANT CHANGE UP (ref 10.3–14.5)
WBC # BLD: 5.85 K/UL — SIGNIFICANT CHANGE UP (ref 3.8–10.5)
WBC # FLD AUTO: 5.85 K/UL — SIGNIFICANT CHANGE UP (ref 3.8–10.5)

## 2020-07-30 ENCOUNTER — OUTPATIENT (OUTPATIENT)
Dept: OUTPATIENT SERVICES | Facility: HOSPITAL | Age: 40
LOS: 1 days | Discharge: HOME | End: 2020-07-30

## 2020-07-30 DIAGNOSIS — C79.10 SECONDARY MALIGNANT NEOPLASM OF UNSPECIFIED URINARY ORGANS: ICD-10-CM

## 2020-08-04 ENCOUNTER — LABORATORY RESULT (OUTPATIENT)
Age: 40
End: 2020-08-04

## 2020-08-04 ENCOUNTER — RESULT REVIEW (OUTPATIENT)
Age: 40
End: 2020-08-04

## 2020-08-04 ENCOUNTER — APPOINTMENT (OUTPATIENT)
Dept: INFUSION THERAPY | Facility: HOSPITAL | Age: 40
End: 2020-08-04

## 2020-08-04 LAB
BASOPHILS # BLD AUTO: 0.03 K/UL — SIGNIFICANT CHANGE UP (ref 0–0.2)
BASOPHILS NFR BLD AUTO: 0.4 % — SIGNIFICANT CHANGE UP (ref 0–2)
EOSINOPHIL # BLD AUTO: 0.04 K/UL — SIGNIFICANT CHANGE UP (ref 0–0.5)
EOSINOPHIL NFR BLD AUTO: 0.6 % — SIGNIFICANT CHANGE UP (ref 0–6)
HCT VFR BLD CALC: 42.8 % — SIGNIFICANT CHANGE UP (ref 34.5–45)
HGB BLD-MCNC: 14.1 G/DL — SIGNIFICANT CHANGE UP (ref 11.5–15.5)
IMM GRANULOCYTES NFR BLD AUTO: 1.2 % — SIGNIFICANT CHANGE UP (ref 0–1.5)
LYMPHOCYTES # BLD AUTO: 0.77 K/UL — LOW (ref 1–3.3)
LYMPHOCYTES # BLD AUTO: 11.4 % — LOW (ref 13–44)
MCHC RBC-ENTMCNC: 31.1 PG — SIGNIFICANT CHANGE UP (ref 27–34)
MCHC RBC-ENTMCNC: 32.9 GM/DL — SIGNIFICANT CHANGE UP (ref 32–36)
MCV RBC AUTO: 94.3 FL — SIGNIFICANT CHANGE UP (ref 80–100)
MONOCYTES # BLD AUTO: 0.45 K/UL — SIGNIFICANT CHANGE UP (ref 0–0.9)
MONOCYTES NFR BLD AUTO: 6.6 % — SIGNIFICANT CHANGE UP (ref 2–14)
NEUTROPHILS # BLD AUTO: 5.4 K/UL — SIGNIFICANT CHANGE UP (ref 1.8–7.4)
NEUTROPHILS NFR BLD AUTO: 79.8 % — HIGH (ref 43–77)
NRBC # BLD: 0 /100 WBCS — SIGNIFICANT CHANGE UP (ref 0–0)
PLATELET # BLD AUTO: 181 K/UL — SIGNIFICANT CHANGE UP (ref 150–400)
RBC # BLD: 4.54 M/UL — SIGNIFICANT CHANGE UP (ref 3.8–5.2)
RBC # FLD: 11.5 % — SIGNIFICANT CHANGE UP (ref 10.3–14.5)
WBC # BLD: 6.77 K/UL — SIGNIFICANT CHANGE UP (ref 3.8–10.5)
WBC # FLD AUTO: 6.77 K/UL — SIGNIFICANT CHANGE UP (ref 3.8–10.5)

## 2020-08-05 DIAGNOSIS — Z51.11 ENCOUNTER FOR ANTINEOPLASTIC CHEMOTHERAPY: ICD-10-CM

## 2020-08-05 DIAGNOSIS — C71.9 MALIGNANT NEOPLASM OF BRAIN, UNSPECIFIED: ICD-10-CM

## 2020-08-05 DIAGNOSIS — R11.2 NAUSEA WITH VOMITING, UNSPECIFIED: ICD-10-CM

## 2020-08-18 ENCOUNTER — RESULT REVIEW (OUTPATIENT)
Age: 40
End: 2020-08-18

## 2020-08-18 ENCOUNTER — LABORATORY RESULT (OUTPATIENT)
Age: 40
End: 2020-08-18

## 2020-08-18 ENCOUNTER — APPOINTMENT (OUTPATIENT)
Dept: INFUSION THERAPY | Facility: HOSPITAL | Age: 40
End: 2020-08-18

## 2020-08-18 ENCOUNTER — OUTPATIENT (OUTPATIENT)
Dept: OUTPATIENT SERVICES | Facility: HOSPITAL | Age: 40
LOS: 1 days | End: 2020-08-18
Payer: COMMERCIAL

## 2020-08-18 ENCOUNTER — APPOINTMENT (OUTPATIENT)
Dept: NEUROLOGY | Facility: CLINIC | Age: 40
End: 2020-08-18
Payer: COMMERCIAL

## 2020-08-18 ENCOUNTER — APPOINTMENT (OUTPATIENT)
Dept: MRI IMAGING | Facility: IMAGING CENTER | Age: 40
End: 2020-08-18
Payer: COMMERCIAL

## 2020-08-18 VITALS
BODY MASS INDEX: 25.61 KG/M2 | TEMPERATURE: 97.7 F | SYSTOLIC BLOOD PRESSURE: 132 MMHG | HEART RATE: 50 BPM | DIASTOLIC BLOOD PRESSURE: 85 MMHG | HEIGHT: 64 IN | WEIGHT: 150 LBS | RESPIRATION RATE: 16 BRPM

## 2020-08-18 DIAGNOSIS — C71.9 MALIGNANT NEOPLASM OF BRAIN, UNSPECIFIED: ICD-10-CM

## 2020-08-18 LAB
BASOPHILS # BLD AUTO: 0.05 K/UL — SIGNIFICANT CHANGE UP (ref 0–0.2)
BASOPHILS NFR BLD AUTO: 0.8 % — SIGNIFICANT CHANGE UP (ref 0–2)
EOSINOPHIL # BLD AUTO: 0.02 K/UL — SIGNIFICANT CHANGE UP (ref 0–0.5)
EOSINOPHIL NFR BLD AUTO: 0.3 % — SIGNIFICANT CHANGE UP (ref 0–6)
HCT VFR BLD CALC: 43.3 % — SIGNIFICANT CHANGE UP (ref 34.5–45)
HGB BLD-MCNC: 14.5 G/DL — SIGNIFICANT CHANGE UP (ref 11.5–15.5)
IMM GRANULOCYTES NFR BLD AUTO: 2.7 % — HIGH (ref 0–1.5)
LYMPHOCYTES # BLD AUTO: 0.87 K/UL — LOW (ref 1–3.3)
LYMPHOCYTES # BLD AUTO: 14 % — SIGNIFICANT CHANGE UP (ref 13–44)
MCHC RBC-ENTMCNC: 31 PG — SIGNIFICANT CHANGE UP (ref 27–34)
MCHC RBC-ENTMCNC: 33.5 GM/DL — SIGNIFICANT CHANGE UP (ref 32–36)
MCV RBC AUTO: 92.7 FL — SIGNIFICANT CHANGE UP (ref 80–100)
MONOCYTES # BLD AUTO: 0.36 K/UL — SIGNIFICANT CHANGE UP (ref 0–0.9)
MONOCYTES NFR BLD AUTO: 5.8 % — SIGNIFICANT CHANGE UP (ref 2–14)
NEUTROPHILS # BLD AUTO: 4.75 K/UL — SIGNIFICANT CHANGE UP (ref 1.8–7.4)
NEUTROPHILS NFR BLD AUTO: 76.4 % — SIGNIFICANT CHANGE UP (ref 43–77)
NRBC # BLD: 0 /100 WBCS — SIGNIFICANT CHANGE UP (ref 0–0)
PLATELET # BLD AUTO: 193 K/UL — SIGNIFICANT CHANGE UP (ref 150–400)
RBC # BLD: 4.67 M/UL — SIGNIFICANT CHANGE UP (ref 3.8–5.2)
RBC # FLD: 11.8 % — SIGNIFICANT CHANGE UP (ref 10.3–14.5)
WBC # BLD: 6.22 K/UL — SIGNIFICANT CHANGE UP (ref 3.8–10.5)
WBC # FLD AUTO: 6.22 K/UL — SIGNIFICANT CHANGE UP (ref 3.8–10.5)

## 2020-08-18 PROCEDURE — 99215 OFFICE O/P EST HI 40 MIN: CPT

## 2020-08-18 PROCEDURE — 70553 MRI BRAIN STEM W/O & W/DYE: CPT | Mod: 26

## 2020-08-18 PROCEDURE — 70553 MRI BRAIN STEM W/O & W/DYE: CPT

## 2020-08-18 PROCEDURE — A9585: CPT

## 2020-08-18 RX ORDER — TEMOZOLOMIDE 140 MG/1
140 CAPSULE ORAL
Qty: 42 | Refills: 0 | Status: DISCONTINUED | COMMUNITY
Start: 2020-06-16 | End: 2020-08-18

## 2020-08-18 RX ORDER — ONDANSETRON 8 MG/1
8 TABLET ORAL
Qty: 30 | Refills: 2 | Status: DISCONTINUED | COMMUNITY
Start: 2020-06-16 | End: 2020-08-18

## 2020-08-18 RX ORDER — DEXAMETHASONE 2 MG/1
2 TABLET ORAL DAILY
Qty: 30 | Refills: 0 | Status: DISCONTINUED | COMMUNITY
Start: 2020-06-16 | End: 2020-08-18

## 2020-08-18 NOTE — DISCUSSION/SUMMARY
[FreeTextEntry1] : Patient seen and examined\par Clinically and radiographically stable\par Will stop Dexamethasone 1 mg daily\par Will order TMZ after reviewing BW\par Will do a clinical follow up in a month\par In the interim, if any questions patient knows to call me

## 2020-08-18 NOTE — PHYSICAL EXAM
[General Appearance - Alert] : alert [General Appearance - In No Acute Distress] : in no acute distress [General Appearance - Well Nourished] : well nourished [] : no respiratory distress [Respiration, Rhythm And Depth] : normal respiratory rhythm and effort [Exaggerated Use Of Accessory Muscles For Inspiration] : no accessory muscle use [Abnormal Walk] : normal gait [FreeTextEntry1] : Patient seen and examined\par Alert, awake , Oriented X 3. Speech clear and fluent\par PERRLA, EOMI, VFF ( Left VFD improved since last visit)\par Face symmetrical. Tongue protrudes midline\par BARRAGAN x 4 with good and equal strength\par FFM, coordination equal bilaterally\par Sensation intact bilaterally\par Gait slow but steady. Ambulating independently\par

## 2020-08-18 NOTE — HISTORY OF PRESENT ILLNESS
[FreeTextEntry1] : Delfina Orona is a 39 yo female who presented at this office for a neuro oncology follow up.\par In brief\par 3/2020 -Developed bifrontal headaches - initially she attributed these to wearing a mask at the beginning of the pandemic. Her symptoms progressed - she began to get lost when driving and had some confusion with tasks that were very ordinary. Headaches also escalated - most significantly post-exercise - and became unrelieved by Tylenol. Her  brought her to the ER at [facility] in early May where neuroimaging was abnormal\par 5/13/2020- Transferred to [facility]. 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 There is also a second area of more solid enhancement - not connected by flair imaging - in the right parahippocampal region measuring about 1 cm. Post-operative MRI scan on 5/14 showed a large right parietal resection cavity with some medial residual enhancement. The right parahippocampal region was unchanged. Patient was able to be discharged home on 5/15/2020. She was discharged on a Decadron taper, Pantoprazole, and Keppra 500 mg bid. There is no history of seizure.\par 6/4/2020- RT started. Chemo was prescribed by Neurosurgeon\par 6/10- Increase in headaches which prompted to repeat the MRI. This scan shows increased enhancement in the dominant lesion crossing into the left side of the posterior corpus callosum. The parahippocampal lesion is slightly bigger - measuring 1.5 cm vs. 1.0 cm.\par RT dates - 6/14 -7/16/2020\par 8/18/2020- Patient here for a follow up. Offers no new complaints\par Denies headaches, seizures, increased weakness

## 2020-08-26 ENCOUNTER — OUTPATIENT (OUTPATIENT)
Dept: OUTPATIENT SERVICES | Facility: HOSPITAL | Age: 40
LOS: 1 days | Discharge: HOME | End: 2020-08-26

## 2020-08-26 DIAGNOSIS — C71.9 MALIGNANT NEOPLASM OF BRAIN, UNSPECIFIED: ICD-10-CM

## 2020-09-01 ENCOUNTER — RESULT REVIEW (OUTPATIENT)
Age: 40
End: 2020-09-01

## 2020-09-01 ENCOUNTER — LABORATORY RESULT (OUTPATIENT)
Age: 40
End: 2020-09-01

## 2020-09-01 ENCOUNTER — APPOINTMENT (OUTPATIENT)
Dept: INFUSION THERAPY | Facility: HOSPITAL | Age: 40
End: 2020-09-01

## 2020-09-01 LAB
BASOPHILS # BLD AUTO: 0.05 K/UL — SIGNIFICANT CHANGE UP (ref 0–0.2)
BASOPHILS NFR BLD AUTO: 1.1 % — SIGNIFICANT CHANGE UP (ref 0–2)
EOSINOPHIL # BLD AUTO: 0.06 K/UL — SIGNIFICANT CHANGE UP (ref 0–0.5)
EOSINOPHIL NFR BLD AUTO: 1.3 % — SIGNIFICANT CHANGE UP (ref 0–6)
HCT VFR BLD CALC: 41.6 % — SIGNIFICANT CHANGE UP (ref 34.5–45)
HGB BLD-MCNC: 13.9 G/DL — SIGNIFICANT CHANGE UP (ref 11.5–15.5)
IMM GRANULOCYTES NFR BLD AUTO: 0.9 % — SIGNIFICANT CHANGE UP (ref 0–1.5)
LYMPHOCYTES # BLD AUTO: 1.18 K/UL — SIGNIFICANT CHANGE UP (ref 1–3.3)
LYMPHOCYTES # BLD AUTO: 26.3 % — SIGNIFICANT CHANGE UP (ref 13–44)
MCHC RBC-ENTMCNC: 30.5 PG — SIGNIFICANT CHANGE UP (ref 27–34)
MCHC RBC-ENTMCNC: 33.4 GM/DL — SIGNIFICANT CHANGE UP (ref 32–36)
MCV RBC AUTO: 91.2 FL — SIGNIFICANT CHANGE UP (ref 80–100)
MONOCYTES # BLD AUTO: 0.58 K/UL — SIGNIFICANT CHANGE UP (ref 0–0.9)
MONOCYTES NFR BLD AUTO: 12.9 % — SIGNIFICANT CHANGE UP (ref 2–14)
NEUTROPHILS # BLD AUTO: 2.57 K/UL — SIGNIFICANT CHANGE UP (ref 1.8–7.4)
NEUTROPHILS NFR BLD AUTO: 57.5 % — SIGNIFICANT CHANGE UP (ref 43–77)
NRBC # BLD: 0 /100 WBCS — SIGNIFICANT CHANGE UP (ref 0–0)
PLATELET # BLD AUTO: 157 K/UL — SIGNIFICANT CHANGE UP (ref 150–400)
RBC # BLD: 4.56 M/UL — SIGNIFICANT CHANGE UP (ref 3.8–5.2)
RBC # FLD: 11.4 % — SIGNIFICANT CHANGE UP (ref 10.3–14.5)
WBC # BLD: 4.48 K/UL — SIGNIFICANT CHANGE UP (ref 3.8–10.5)
WBC # FLD AUTO: 4.48 K/UL — SIGNIFICANT CHANGE UP (ref 3.8–10.5)

## 2020-09-02 DIAGNOSIS — Z51.11 ENCOUNTER FOR ANTINEOPLASTIC CHEMOTHERAPY: ICD-10-CM

## 2020-09-15 ENCOUNTER — LABORATORY RESULT (OUTPATIENT)
Age: 40
End: 2020-09-15

## 2020-09-15 ENCOUNTER — RESULT REVIEW (OUTPATIENT)
Age: 40
End: 2020-09-15

## 2020-09-15 ENCOUNTER — APPOINTMENT (OUTPATIENT)
Dept: INFUSION THERAPY | Facility: HOSPITAL | Age: 40
End: 2020-09-15

## 2020-09-15 LAB
BASOPHILS # BLD AUTO: 0.04 K/UL — SIGNIFICANT CHANGE UP (ref 0–0.2)
BASOPHILS NFR BLD AUTO: 1 % — SIGNIFICANT CHANGE UP (ref 0–2)
EOSINOPHIL # BLD AUTO: 0.07 K/UL — SIGNIFICANT CHANGE UP (ref 0–0.5)
EOSINOPHIL NFR BLD AUTO: 1.8 % — SIGNIFICANT CHANGE UP (ref 0–6)
HCT VFR BLD CALC: 39.3 % — SIGNIFICANT CHANGE UP (ref 34.5–45)
HGB BLD-MCNC: 13.1 G/DL — SIGNIFICANT CHANGE UP (ref 11.5–15.5)
IMM GRANULOCYTES NFR BLD AUTO: 1 % — SIGNIFICANT CHANGE UP (ref 0–1.5)
LYMPHOCYTES # BLD AUTO: 0.96 K/UL — LOW (ref 1–3.3)
LYMPHOCYTES # BLD AUTO: 24.4 % — SIGNIFICANT CHANGE UP (ref 13–44)
MCHC RBC-ENTMCNC: 31 PG — SIGNIFICANT CHANGE UP (ref 27–34)
MCHC RBC-ENTMCNC: 33.3 G/DL — SIGNIFICANT CHANGE UP (ref 32–36)
MCV RBC AUTO: 92.9 FL — SIGNIFICANT CHANGE UP (ref 80–100)
MONOCYTES # BLD AUTO: 0.48 K/UL — SIGNIFICANT CHANGE UP (ref 0–0.9)
MONOCYTES NFR BLD AUTO: 12.2 % — SIGNIFICANT CHANGE UP (ref 2–14)
NEUTROPHILS # BLD AUTO: 2.34 K/UL — SIGNIFICANT CHANGE UP (ref 1.8–7.4)
NEUTROPHILS NFR BLD AUTO: 59.6 % — SIGNIFICANT CHANGE UP (ref 43–77)
NRBC # BLD: 0 /100 WBCS — SIGNIFICANT CHANGE UP (ref 0–0)
PLATELET # BLD AUTO: 166 K/UL — SIGNIFICANT CHANGE UP (ref 150–400)
RBC # BLD: 4.23 M/UL — SIGNIFICANT CHANGE UP (ref 3.8–5.2)
RBC # FLD: 11.7 % — SIGNIFICANT CHANGE UP (ref 10.3–14.5)
WBC # BLD: 3.93 K/UL — SIGNIFICANT CHANGE UP (ref 3.8–10.5)
WBC # FLD AUTO: 3.93 K/UL — SIGNIFICANT CHANGE UP (ref 3.8–10.5)

## 2020-09-22 ENCOUNTER — OUTPATIENT (OUTPATIENT)
Dept: OUTPATIENT SERVICES | Facility: HOSPITAL | Age: 40
LOS: 1 days | Discharge: HOME | End: 2020-09-22

## 2020-09-22 DIAGNOSIS — C71.9 MALIGNANT NEOPLASM OF BRAIN, UNSPECIFIED: ICD-10-CM

## 2020-09-29 ENCOUNTER — LABORATORY RESULT (OUTPATIENT)
Age: 40
End: 2020-09-29

## 2020-09-29 ENCOUNTER — RESULT REVIEW (OUTPATIENT)
Age: 40
End: 2020-09-29

## 2020-09-29 ENCOUNTER — APPOINTMENT (OUTPATIENT)
Dept: NEUROLOGY | Facility: CLINIC | Age: 40
End: 2020-09-29
Payer: COMMERCIAL

## 2020-09-29 ENCOUNTER — APPOINTMENT (OUTPATIENT)
Dept: INFUSION THERAPY | Facility: HOSPITAL | Age: 40
End: 2020-09-29

## 2020-09-29 VITALS
RESPIRATION RATE: 16 BRPM | HEIGHT: 64 IN | SYSTOLIC BLOOD PRESSURE: 148 MMHG | WEIGHT: 142 LBS | BODY MASS INDEX: 24.24 KG/M2 | HEART RATE: 72 BPM | DIASTOLIC BLOOD PRESSURE: 89 MMHG | TEMPERATURE: 97.8 F

## 2020-09-29 LAB
BASOPHILS # BLD AUTO: 0.04 K/UL — SIGNIFICANT CHANGE UP (ref 0–0.2)
BASOPHILS NFR BLD AUTO: 1 % — SIGNIFICANT CHANGE UP (ref 0–2)
EOSINOPHIL # BLD AUTO: 0.07 K/UL — SIGNIFICANT CHANGE UP (ref 0–0.5)
EOSINOPHIL NFR BLD AUTO: 1.8 % — SIGNIFICANT CHANGE UP (ref 0–6)
HCT VFR BLD CALC: 41.9 % — SIGNIFICANT CHANGE UP (ref 34.5–45)
HGB BLD-MCNC: 13.9 G/DL — SIGNIFICANT CHANGE UP (ref 11.5–15.5)
IMM GRANULOCYTES NFR BLD AUTO: 0.3 % — SIGNIFICANT CHANGE UP (ref 0–1.5)
LYMPHOCYTES # BLD AUTO: 0.98 K/UL — LOW (ref 1–3.3)
LYMPHOCYTES # BLD AUTO: 25.3 % — SIGNIFICANT CHANGE UP (ref 13–44)
MCHC RBC-ENTMCNC: 30.5 PG — SIGNIFICANT CHANGE UP (ref 27–34)
MCHC RBC-ENTMCNC: 33.2 G/DL — SIGNIFICANT CHANGE UP (ref 32–36)
MCV RBC AUTO: 92.1 FL — SIGNIFICANT CHANGE UP (ref 80–100)
MONOCYTES # BLD AUTO: 0.51 K/UL — SIGNIFICANT CHANGE UP (ref 0–0.9)
MONOCYTES NFR BLD AUTO: 13.1 % — SIGNIFICANT CHANGE UP (ref 2–14)
NEUTROPHILS # BLD AUTO: 2.27 K/UL — SIGNIFICANT CHANGE UP (ref 1.8–7.4)
NEUTROPHILS NFR BLD AUTO: 58.5 % — SIGNIFICANT CHANGE UP (ref 43–77)
NRBC # BLD: 0 /100 WBCS — SIGNIFICANT CHANGE UP (ref 0–0)
PLATELET # BLD AUTO: 155 K/UL — SIGNIFICANT CHANGE UP (ref 150–400)
RBC # BLD: 4.55 M/UL — SIGNIFICANT CHANGE UP (ref 3.8–5.2)
RBC # FLD: 12 % — SIGNIFICANT CHANGE UP (ref 10.3–14.5)
WBC # BLD: 3.88 K/UL — SIGNIFICANT CHANGE UP (ref 3.8–10.5)
WBC # FLD AUTO: 3.88 K/UL — SIGNIFICANT CHANGE UP (ref 3.8–10.5)

## 2020-09-29 PROCEDURE — 99215 OFFICE O/P EST HI 40 MIN: CPT

## 2020-09-29 RX ORDER — LEVETIRACETAM 500 MG/1
500 TABLET, FILM COATED ORAL
Qty: 60 | Refills: 6 | Status: DISCONTINUED | COMMUNITY
Start: 2020-06-16 | End: 2020-09-29

## 2020-09-29 NOTE — DISCUSSION/SUMMARY
[FreeTextEntry1] : Patient seen and examined\par Clinically stable\par Due for second cycle of TMZ on 10/3\par Will order TMZ after reviewing blood works\par Reinforced weekly blood works\par Recommended to follow up with GYN for vaginal spotting\par Will restart Dexamathsone 2 mg daily. They will call me on Friday with an update\par Will do an MRI along with a follow up on Oct 27\par In the interim, if any questions they know to call me.

## 2020-09-29 NOTE — PHYSICAL EXAM
[General Appearance - Alert] : alert [General Appearance - In No Acute Distress] : in no acute distress [] : no respiratory distress [Respiration, Rhythm And Depth] : normal respiratory rhythm and effort [Exaggerated Use Of Accessory Muscles For Inspiration] : no accessory muscle use [Abnormal Walk] : normal gait [FreeTextEntry1] : Patient seen and examined\par Alert, awake , Oriented X 3. Speech clear and fluent\par PERRLA, EOMI, VFF\par Face symmetrical. Tongue protrudes midline\par BARRAGAN x 4 with good and equal strength\par FFM, coordination equal bilaterally\par Sensation intact bilaterally\par Gait steady. Ambulating independently\par

## 2020-09-29 NOTE — HISTORY OF PRESENT ILLNESS
[FreeTextEntry1] : Delfina Orona is a 41 yo female who presented at this office for a neuro oncology follow up.\par In brief\par 3/2020 -Developed bifrontal headaches - initially she attributed these to wearing a mask at the beginning of the pandemic. Her symptoms progressed - she began to get lost when driving and had some confusion with tasks that were very ordinary. Headaches also escalated - most significantly post-exercise - and became unrelieved by Tylenol. Her  brought her to the ER at [facility] in early May where neuroimaging was abnormal\par 5/13/2020- Transferred to [facility]. 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 There is also a second area of more solid enhancement - not connected by flair imaging - in the right parahippocampal region measuring about 1 cm. Post-operative MRI scan on 5/14 showed a large right parietal resection cavity with some medial residual enhancement. The right parahippocampal region was unchanged. Patient was able to be discharged home on 5/15/2020. She was discharged on a Decadron taper, Pantoprazole, and Keppra 500 mg bid. There is no history of seizure.\par 6/4/2020- RT started. Chemo was prescribed by Neurosurgeon\par 6/10- Increase in headaches which prompted to repeat the MRI. This scan shows increased enhancement in the dominant lesion crossing into the left side of the posterior corpus callosum. The parahippocampal lesion is slightly bigger - measuring 1.5 cm vs. 1.0 cm.\par RT dates - 6/14 -7/16/2020\par First cycle of TMZ - 9/5 -9/9\par 9/29/2020- Patient here for a follow up. Offers no new complaints other than feeling tired since lowering steroids. She also reports spotting, she has Mirena IUD in place. Planning to follow up with GYN. Patient also been taking Keppra only once at bedtime as she couldn’t function in the am after taking Keppra \par Denies headaches, seizures, increased weakness

## 2020-09-30 DIAGNOSIS — Z51.11 ENCOUNTER FOR ANTINEOPLASTIC CHEMOTHERAPY: ICD-10-CM

## 2020-09-30 RX ORDER — TEMOZOLOMIDE 250 MG/1
250 CAPSULE ORAL AT BEDTIME
Qty: 5 | Refills: 0 | Status: DISCONTINUED | COMMUNITY
Start: 2020-08-19 | End: 2020-09-30

## 2020-09-30 RX ORDER — TEMOZOLOMIDE 5 MG/1
5 CAPSULE ORAL AT BEDTIME
Qty: 10 | Refills: 0 | Status: DISCONTINUED | COMMUNITY
Start: 2020-08-19 | End: 2020-09-30

## 2020-10-02 ENCOUNTER — TRANSCRIPTION ENCOUNTER (OUTPATIENT)
Age: 40
End: 2020-10-02

## 2020-10-13 ENCOUNTER — RESULT REVIEW (OUTPATIENT)
Age: 40
End: 2020-10-13

## 2020-10-13 ENCOUNTER — APPOINTMENT (OUTPATIENT)
Dept: INFUSION THERAPY | Facility: HOSPITAL | Age: 40
End: 2020-10-13

## 2020-10-13 ENCOUNTER — LABORATORY RESULT (OUTPATIENT)
Age: 40
End: 2020-10-13

## 2020-10-13 LAB
BASOPHILS # BLD AUTO: 0.03 K/UL — SIGNIFICANT CHANGE UP (ref 0–0.2)
BASOPHILS NFR BLD AUTO: 0.7 % — SIGNIFICANT CHANGE UP (ref 0–2)
EOSINOPHIL # BLD AUTO: 0.15 K/UL — SIGNIFICANT CHANGE UP (ref 0–0.5)
EOSINOPHIL NFR BLD AUTO: 3.5 % — SIGNIFICANT CHANGE UP (ref 0–6)
HCT VFR BLD CALC: 41.2 % — SIGNIFICANT CHANGE UP (ref 34.5–45)
HGB BLD-MCNC: 13.6 G/DL — SIGNIFICANT CHANGE UP (ref 11.5–15.5)
IMM GRANULOCYTES NFR BLD AUTO: 0.7 % — SIGNIFICANT CHANGE UP (ref 0–1.5)
LYMPHOCYTES # BLD AUTO: 0.79 K/UL — LOW (ref 1–3.3)
LYMPHOCYTES # BLD AUTO: 18.5 % — SIGNIFICANT CHANGE UP (ref 13–44)
MCHC RBC-ENTMCNC: 30.3 PG — SIGNIFICANT CHANGE UP (ref 27–34)
MCHC RBC-ENTMCNC: 33 G/DL — SIGNIFICANT CHANGE UP (ref 32–36)
MCV RBC AUTO: 91.8 FL — SIGNIFICANT CHANGE UP (ref 80–100)
MONOCYTES # BLD AUTO: 0.65 K/UL — SIGNIFICANT CHANGE UP (ref 0–0.9)
MONOCYTES NFR BLD AUTO: 15.3 % — HIGH (ref 2–14)
NEUTROPHILS # BLD AUTO: 2.61 K/UL — SIGNIFICANT CHANGE UP (ref 1.8–7.4)
NEUTROPHILS NFR BLD AUTO: 61.3 % — SIGNIFICANT CHANGE UP (ref 43–77)
NRBC # BLD: 0 /100 WBCS — SIGNIFICANT CHANGE UP (ref 0–0)
PLATELET # BLD AUTO: 146 K/UL — LOW (ref 150–400)
RBC # BLD: 4.49 M/UL — SIGNIFICANT CHANGE UP (ref 3.8–5.2)
RBC # FLD: 11.7 % — SIGNIFICANT CHANGE UP (ref 10.3–14.5)
WBC # BLD: 4.26 K/UL — SIGNIFICANT CHANGE UP (ref 3.8–10.5)
WBC # FLD AUTO: 4.26 K/UL — SIGNIFICANT CHANGE UP (ref 3.8–10.5)

## 2020-10-21 ENCOUNTER — OUTPATIENT (OUTPATIENT)
Dept: OUTPATIENT SERVICES | Facility: HOSPITAL | Age: 40
LOS: 1 days | Discharge: HOME | End: 2020-10-21

## 2020-10-21 DIAGNOSIS — C71.9 MALIGNANT NEOPLASM OF BRAIN, UNSPECIFIED: ICD-10-CM

## 2020-10-27 ENCOUNTER — LABORATORY RESULT (OUTPATIENT)
Age: 40
End: 2020-10-27

## 2020-10-27 ENCOUNTER — APPOINTMENT (OUTPATIENT)
Dept: NEUROLOGY | Facility: CLINIC | Age: 40
End: 2020-10-27
Payer: COMMERCIAL

## 2020-10-27 ENCOUNTER — RESULT REVIEW (OUTPATIENT)
Age: 40
End: 2020-10-27

## 2020-10-27 ENCOUNTER — APPOINTMENT (OUTPATIENT)
Dept: INFUSION THERAPY | Facility: HOSPITAL | Age: 40
End: 2020-10-27

## 2020-10-27 ENCOUNTER — APPOINTMENT (OUTPATIENT)
Dept: MRI IMAGING | Facility: IMAGING CENTER | Age: 40
End: 2020-10-27
Payer: COMMERCIAL

## 2020-10-27 ENCOUNTER — OUTPATIENT (OUTPATIENT)
Dept: OUTPATIENT SERVICES | Facility: HOSPITAL | Age: 40
LOS: 1 days | End: 2020-10-27
Payer: COMMERCIAL

## 2020-10-27 VITALS
WEIGHT: 139 LBS | DIASTOLIC BLOOD PRESSURE: 94 MMHG | BODY MASS INDEX: 23.73 KG/M2 | SYSTOLIC BLOOD PRESSURE: 143 MMHG | HEIGHT: 64 IN | HEART RATE: 58 BPM | OXYGEN SATURATION: 99 % | TEMPERATURE: 98 F | RESPIRATION RATE: 16 BRPM

## 2020-10-27 DIAGNOSIS — C71.9 MALIGNANT NEOPLASM OF BRAIN, UNSPECIFIED: ICD-10-CM

## 2020-10-27 LAB
BASOPHILS # BLD AUTO: 0.01 K/UL — SIGNIFICANT CHANGE UP (ref 0–0.2)
BASOPHILS NFR BLD AUTO: 0.3 % — SIGNIFICANT CHANGE UP (ref 0–2)
EOSINOPHIL # BLD AUTO: 0.1 K/UL — SIGNIFICANT CHANGE UP (ref 0–0.5)
EOSINOPHIL NFR BLD AUTO: 3.1 % — SIGNIFICANT CHANGE UP (ref 0–6)
HCT VFR BLD CALC: 42.1 % — SIGNIFICANT CHANGE UP (ref 34.5–45)
HGB BLD-MCNC: 14.3 G/DL — SIGNIFICANT CHANGE UP (ref 11.5–15.5)
IMM GRANULOCYTES NFR BLD AUTO: 1.5 % — SIGNIFICANT CHANGE UP (ref 0–1.5)
LYMPHOCYTES # BLD AUTO: 0.84 K/UL — LOW (ref 1–3.3)
LYMPHOCYTES # BLD AUTO: 25.8 % — SIGNIFICANT CHANGE UP (ref 13–44)
MCHC RBC-ENTMCNC: 31.1 PG — SIGNIFICANT CHANGE UP (ref 27–34)
MCHC RBC-ENTMCNC: 34 G/DL — SIGNIFICANT CHANGE UP (ref 32–36)
MCV RBC AUTO: 91.5 FL — SIGNIFICANT CHANGE UP (ref 80–100)
MONOCYTES # BLD AUTO: 0.46 K/UL — SIGNIFICANT CHANGE UP (ref 0–0.9)
MONOCYTES NFR BLD AUTO: 14.2 % — HIGH (ref 2–14)
NEUTROPHILS # BLD AUTO: 1.79 K/UL — LOW (ref 1.8–7.4)
NEUTROPHILS NFR BLD AUTO: 55.1 % — SIGNIFICANT CHANGE UP (ref 43–77)
NRBC # BLD: 0 /100 WBCS — SIGNIFICANT CHANGE UP (ref 0–0)
PLATELET # BLD AUTO: 149 K/UL — LOW (ref 150–400)
RBC # BLD: 4.6 M/UL — SIGNIFICANT CHANGE UP (ref 3.8–5.2)
RBC # FLD: 12.5 % — SIGNIFICANT CHANGE UP (ref 10.3–14.5)
WBC # BLD: 3.25 K/UL — LOW (ref 3.8–10.5)
WBC # FLD AUTO: 3.25 K/UL — LOW (ref 3.8–10.5)

## 2020-10-27 PROCEDURE — 70553 MRI BRAIN STEM W/O & W/DYE: CPT | Mod: 26

## 2020-10-27 PROCEDURE — 70553 MRI BRAIN STEM W/O & W/DYE: CPT

## 2020-10-27 PROCEDURE — 99215 OFFICE O/P EST HI 40 MIN: CPT

## 2020-10-27 PROCEDURE — 99072 ADDL SUPL MATRL&STAF TM PHE: CPT

## 2020-10-27 PROCEDURE — A9585: CPT

## 2020-10-27 RX ORDER — DEXAMETHASONE 2 MG/1
2 TABLET ORAL DAILY
Qty: 30 | Refills: 0 | Status: DISCONTINUED | COMMUNITY
Start: 2020-09-29 | End: 2020-10-27

## 2020-10-27 RX ORDER — PANTOPRAZOLE 40 MG/1
40 TABLET, DELAYED RELEASE ORAL DAILY
Qty: 30 | Refills: 2 | Status: DISCONTINUED | COMMUNITY
Start: 2020-06-16 | End: 2020-10-27

## 2020-10-27 RX ORDER — PANTOPRAZOLE 40 MG/1
40 TABLET, DELAYED RELEASE ORAL
Qty: 30 | Refills: 2 | Status: ACTIVE | COMMUNITY
Start: 2020-10-27 | End: 1900-01-01

## 2020-10-27 RX ORDER — TEMOZOLOMIDE 140 MG/1
140 CAPSULE ORAL
Qty: 5 | Refills: 0 | Status: DISCONTINUED | COMMUNITY
Start: 2020-09-30 | End: 2020-10-27

## 2020-10-27 RX ORDER — TEMOZOLOMIDE 100 MG/1
100 CAPSULE ORAL
Qty: 10 | Refills: 0 | Status: DISCONTINUED | COMMUNITY
Start: 2020-09-30 | End: 2020-10-27

## 2020-10-27 RX ORDER — LEVETIRACETAM 500 MG/1
500 TABLET, FILM COATED, EXTENDED RELEASE ORAL DAILY
Qty: 60 | Refills: 6 | Status: ACTIVE | COMMUNITY
Start: 2020-09-29 | End: 1900-01-01

## 2020-10-27 NOTE — PHYSICAL EXAM
[General Appearance - Alert] : alert [General Appearance - In No Acute Distress] : in no acute distress [General Appearance - Well Nourished] : well nourished [Oriented To Time, Place, And Person] : oriented to person, place, and time [Edema] : there was no peripheral edema [FreeTextEntry1] : Patient seen and examined\par Alert, awake , Oriented X 3. Speech clear and fluent\par PERRLA, EOMI, LLQ VF defect\par Face symmetrical. Tongue protrudes midline\par BARRAGAN x 4 with good and equal strength\par FFM, coordination equal bilaterally\par Sensation intact bilaterally\par Gait steady. Ambulating independently\par

## 2020-10-27 NOTE — HISTORY OF PRESENT ILLNESS
[FreeTextEntry1] : Delfina Orona is a 39 yo female who presented at this office for a neuro oncology follow up.\par In brief\par 3/2020 -Developed bifrontal headaches - initially she attributed these to wearing a mask at the beginning of the pandemic. Her symptoms progressed - she began to get lost when driving and had some confusion with tasks that were very ordinary. Headaches also escalated - most significantly post-exercise - and became unrelieved by Tylenol. Her  brought her to the ER at [facility] in early May where neuroimaging was abnormal\par 5/13/2020- Transferred to [facility]. 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 There is also a second area of more solid enhancement - not connected by flair imaging - in the right parahippocampal region measuring about 1 cm. Post-operative MRI scan on 5/14 showed a large right parietal resection cavity with some medial residual enhancement. The right parahippocampal region was unchanged. Patient was able to be discharged home on 5/15/2020. She was discharged on a Decadron taper, Pantoprazole, and Keppra 500 mg bid. There is no history of seizure.\par 6/4/2020- RT started. Chemo was prescribed by Neurosurgeon\par 6/10- Increase in headaches which prompted to repeat the MRI. This scan shows increased enhancement in the dominant lesion crossing into the left side of the posterior corpus callosum. The parahippocampal lesion is slightly bigger - measuring 1.5 cm vs. 1.0 cm.\par RT dates - 6/14 -7/16/2020\par First cycle of TMZ - 9/5 -9/9\par 103 - Second cycle\par 10/27- Here for a follow up along with a new MRI. Reports that for almost a month she has been having headaches especially when she lies down , and feels better when she sits up. She also started having nausea for the past couple of weeks. c/o generalized tiredness.\par Denies  seizures, increased weakness.

## 2020-10-27 NOTE — DISCUSSION/SUMMARY
[FreeTextEntry1] : Patient seen and examined.\par Clinically not worse.\par MRI showed POD.\par Will start on Dexamethasone 4 mg bid.\par Discussed IA Cetuximab trial vs adding IV Irinotecan along with IV Avastin.\par Will do a clinical follow up in 2 weeks.\par In the interim, if any questions patient knows to call me.

## 2020-10-28 DIAGNOSIS — R11.2 NAUSEA WITH VOMITING, UNSPECIFIED: ICD-10-CM

## 2020-10-28 DIAGNOSIS — Z51.11 ENCOUNTER FOR ANTINEOPLASTIC CHEMOTHERAPY: ICD-10-CM

## 2020-11-10 ENCOUNTER — RESULT REVIEW (OUTPATIENT)
Age: 40
End: 2020-11-10

## 2020-11-10 ENCOUNTER — LABORATORY RESULT (OUTPATIENT)
Age: 40
End: 2020-11-10

## 2020-11-10 ENCOUNTER — APPOINTMENT (OUTPATIENT)
Dept: INFUSION THERAPY | Facility: HOSPITAL | Age: 40
End: 2020-11-10

## 2020-11-10 ENCOUNTER — APPOINTMENT (OUTPATIENT)
Dept: NEUROLOGY | Facility: CLINIC | Age: 40
End: 2020-11-10
Payer: COMMERCIAL

## 2020-11-10 VITALS
HEART RATE: 59 BPM | HEIGHT: 64 IN | DIASTOLIC BLOOD PRESSURE: 88 MMHG | SYSTOLIC BLOOD PRESSURE: 135 MMHG | RESPIRATION RATE: 16 BRPM | OXYGEN SATURATION: 98 % | WEIGHT: 139 LBS | BODY MASS INDEX: 23.73 KG/M2 | TEMPERATURE: 99 F

## 2020-11-10 LAB
BASOPHILS # BLD AUTO: 0.02 K/UL — SIGNIFICANT CHANGE UP (ref 0–0.2)
BASOPHILS NFR BLD AUTO: 0.3 % — SIGNIFICANT CHANGE UP (ref 0–2)
EOSINOPHIL # BLD AUTO: 0.22 K/UL — SIGNIFICANT CHANGE UP (ref 0–0.5)
EOSINOPHIL NFR BLD AUTO: 3.8 % — SIGNIFICANT CHANGE UP (ref 0–6)
HCT VFR BLD CALC: 42.1 % — SIGNIFICANT CHANGE UP (ref 34.5–45)
HGB BLD-MCNC: 14.1 G/DL — SIGNIFICANT CHANGE UP (ref 11.5–15.5)
IMM GRANULOCYTES NFR BLD AUTO: 0.5 % — SIGNIFICANT CHANGE UP (ref 0–1.5)
LYMPHOCYTES # BLD AUTO: 0.49 K/UL — LOW (ref 1–3.3)
LYMPHOCYTES # BLD AUTO: 8.5 % — LOW (ref 13–44)
MCHC RBC-ENTMCNC: 31.1 PG — SIGNIFICANT CHANGE UP (ref 27–34)
MCHC RBC-ENTMCNC: 33.5 G/DL — SIGNIFICANT CHANGE UP (ref 32–36)
MCV RBC AUTO: 92.7 FL — SIGNIFICANT CHANGE UP (ref 80–100)
MONOCYTES # BLD AUTO: 0.1 K/UL — SIGNIFICANT CHANGE UP (ref 0–0.9)
MONOCYTES NFR BLD AUTO: 1.7 % — LOW (ref 2–14)
NEUTROPHILS # BLD AUTO: 4.93 K/UL — SIGNIFICANT CHANGE UP (ref 1.8–7.4)
NEUTROPHILS NFR BLD AUTO: 85.2 % — HIGH (ref 43–77)
NRBC # BLD: 0 /100 WBCS — SIGNIFICANT CHANGE UP (ref 0–0)
PLATELET # BLD AUTO: 189 K/UL — SIGNIFICANT CHANGE UP (ref 150–400)
RBC # BLD: 4.54 M/UL — SIGNIFICANT CHANGE UP (ref 3.8–5.2)
RBC # FLD: 13.4 % — SIGNIFICANT CHANGE UP (ref 10.3–14.5)
WBC # BLD: 5.79 K/UL — SIGNIFICANT CHANGE UP (ref 3.8–10.5)
WBC # FLD AUTO: 5.79 K/UL — SIGNIFICANT CHANGE UP (ref 3.8–10.5)

## 2020-11-10 PROCEDURE — 99215 OFFICE O/P EST HI 40 MIN: CPT

## 2020-11-10 PROCEDURE — 99072 ADDL SUPL MATRL&STAF TM PHE: CPT

## 2020-11-10 NOTE — PHYSICAL EXAM
[General Appearance - Alert] : alert [General Appearance - In No Acute Distress] : in no acute distress [General Appearance - Well Nourished] : well nourished [Oriented To Time, Place, And Person] : oriented to person, place, and time [Impaired Insight] : insight and judgment were intact [Affect] : the affect was normal [] : no respiratory distress [Respiration, Rhythm And Depth] : normal respiratory rhythm and effort [Exaggerated Use Of Accessory Muscles For Inspiration] : no accessory muscle use [FreeTextEntry1] : Patient seen and examined\par Alert, awake , Oriented X 3. Speech clear and fluent\par PERRLA, EOMI, LLQ VF defect\par Face symmetrical. Tongue protrudes midline\par BARRAGAN x 4 with good and equal strength\par FFM, coordination equal bilaterally\par Sensation intact bilaterally\par Gait steady. Ambulating independently

## 2020-11-10 NOTE — DISCUSSION/SUMMARY
[FreeTextEntry1] : Patient seen and examined\par Clinically stable\par Irinotecan to be added to IV Avastin starting today. Risks and benefits explained. Consent obtained\par Continue Dexamethasone 4 mg daily\par Will do a clinical follow up in a month\par In the interim, if any concerns patient knows to call our office.

## 2020-11-10 NOTE — HISTORY OF PRESENT ILLNESS
[FreeTextEntry1] : Delfina Orona is a 39 yo female who presented at this office for a neuro oncology follow up.\par In brief\par 3/2020 -Developed bifrontal headaches - initially she attributed these to wearing a mask at the beginning of the pandemic. Her symptoms progressed - she began to get lost when driving and had some confusion with tasks that were very ordinary. Headaches also escalated - most significantly post-exercise - and became unrelieved by Tylenol. Her  brought her to the ER at [facility] in early May where neuroimaging was abnormal\par 5/13/2020- Transferred to [facility]. 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 There is also a second area of more solid enhancement - not connected by flair imaging - in the right parahippocampal region measuring about 1 cm. Post-operative MRI scan on 5/14 showed a large right parietal resection cavity with some medial residual enhancement. The right parahippocampal region was unchanged. Patient was able to be discharged home on 5/15/2020. She was discharged on a Decadron taper, Pantoprazole, and Keppra 500 mg bid. There is no history of seizure.\par 6/4/2020- RT started. Chemo was prescribed by Neurosurgeon\par 6/10- Increase in headaches which prompted to repeat the MRI. This scan shows increased enhancement in the dominant lesion crossing into the left side of the posterior corpus callosum. The parahippocampal lesion is slightly bigger - measuring 1.5 cm vs. 1.0 cm.\par RT dates - 6/14 -7/16/2020\par First cycle of TMZ - 9/5 -9/9\par 103 - Second cycle\par 10/27- MRI showed POD. Plan was made to Rx with IV Irinotecan along with IV Avastin\par 11/10/2020- Patient here for a follow up. Offers no new complaints. She only been taking Dexamethasone 4 mg od instead of bid and denies any headaches\par Denies seizures, increased weakness.

## 2020-11-19 ENCOUNTER — OUTPATIENT (OUTPATIENT)
Dept: OUTPATIENT SERVICES | Facility: HOSPITAL | Age: 40
LOS: 1 days | Discharge: HOME | End: 2020-11-19

## 2020-11-19 DIAGNOSIS — C71.9 MALIGNANT NEOPLASM OF BRAIN, UNSPECIFIED: ICD-10-CM

## 2020-11-24 ENCOUNTER — LABORATORY RESULT (OUTPATIENT)
Age: 40
End: 2020-11-24

## 2020-11-24 ENCOUNTER — APPOINTMENT (OUTPATIENT)
Dept: INFUSION THERAPY | Facility: HOSPITAL | Age: 40
End: 2020-11-24

## 2020-11-24 ENCOUNTER — RESULT REVIEW (OUTPATIENT)
Age: 40
End: 2020-11-24

## 2020-11-24 LAB
BASOPHILS # BLD AUTO: 0.02 K/UL — SIGNIFICANT CHANGE UP (ref 0–0.2)
BASOPHILS NFR BLD AUTO: 0.5 % — SIGNIFICANT CHANGE UP (ref 0–2)
EOSINOPHIL # BLD AUTO: 0.05 K/UL — SIGNIFICANT CHANGE UP (ref 0–0.5)
EOSINOPHIL NFR BLD AUTO: 1.4 % — SIGNIFICANT CHANGE UP (ref 0–6)
HCT VFR BLD CALC: 44.7 % — SIGNIFICANT CHANGE UP (ref 34.5–45)
HGB BLD-MCNC: 14.7 G/DL — SIGNIFICANT CHANGE UP (ref 11.5–15.5)
IMM GRANULOCYTES NFR BLD AUTO: 0.3 % — SIGNIFICANT CHANGE UP (ref 0–1.5)
LYMPHOCYTES # BLD AUTO: 1 K/UL — SIGNIFICANT CHANGE UP (ref 1–3.3)
LYMPHOCYTES # BLD AUTO: 27.2 % — SIGNIFICANT CHANGE UP (ref 13–44)
MCHC RBC-ENTMCNC: 30.9 PG — SIGNIFICANT CHANGE UP (ref 27–34)
MCHC RBC-ENTMCNC: 32.9 G/DL — SIGNIFICANT CHANGE UP (ref 32–36)
MCV RBC AUTO: 93.9 FL — SIGNIFICANT CHANGE UP (ref 80–100)
MONOCYTES # BLD AUTO: 0.38 K/UL — SIGNIFICANT CHANGE UP (ref 0–0.9)
MONOCYTES NFR BLD AUTO: 10.4 % — SIGNIFICANT CHANGE UP (ref 2–14)
NEUTROPHILS # BLD AUTO: 2.21 K/UL — SIGNIFICANT CHANGE UP (ref 1.8–7.4)
NEUTROPHILS NFR BLD AUTO: 60.2 % — SIGNIFICANT CHANGE UP (ref 43–77)
NRBC # BLD: 0 /100 WBCS — SIGNIFICANT CHANGE UP (ref 0–0)
PLATELET # BLD AUTO: 145 K/UL — LOW (ref 150–400)
RBC # BLD: 4.76 M/UL — SIGNIFICANT CHANGE UP (ref 3.8–5.2)
RBC # FLD: 13.2 % — SIGNIFICANT CHANGE UP (ref 10.3–14.5)
WBC # BLD: 3.67 K/UL — LOW (ref 3.8–10.5)
WBC # FLD AUTO: 3.67 K/UL — LOW (ref 3.8–10.5)

## 2020-11-25 DIAGNOSIS — Z51.11 ENCOUNTER FOR ANTINEOPLASTIC CHEMOTHERAPY: ICD-10-CM

## 2020-11-25 DIAGNOSIS — R11.2 NAUSEA WITH VOMITING, UNSPECIFIED: ICD-10-CM

## 2020-12-08 ENCOUNTER — RESULT REVIEW (OUTPATIENT)
Age: 40
End: 2020-12-08

## 2020-12-08 ENCOUNTER — LABORATORY RESULT (OUTPATIENT)
Age: 40
End: 2020-12-08

## 2020-12-08 ENCOUNTER — APPOINTMENT (OUTPATIENT)
Dept: NEUROLOGY | Facility: CLINIC | Age: 40
End: 2020-12-08
Payer: COMMERCIAL

## 2020-12-08 ENCOUNTER — APPOINTMENT (OUTPATIENT)
Dept: INFUSION THERAPY | Facility: HOSPITAL | Age: 40
End: 2020-12-08

## 2020-12-08 LAB
BASOPHILS # BLD AUTO: 0.04 K/UL — SIGNIFICANT CHANGE UP (ref 0–0.2)
BASOPHILS NFR BLD AUTO: 1.3 % — SIGNIFICANT CHANGE UP (ref 0–2)
EOSINOPHIL # BLD AUTO: 0.04 K/UL — SIGNIFICANT CHANGE UP (ref 0–0.5)
EOSINOPHIL NFR BLD AUTO: 1.3 % — SIGNIFICANT CHANGE UP (ref 0–6)
HCT VFR BLD CALC: 41.5 % — SIGNIFICANT CHANGE UP (ref 34.5–45)
HGB BLD-MCNC: 13.8 G/DL — SIGNIFICANT CHANGE UP (ref 11.5–15.5)
IMM GRANULOCYTES NFR BLD AUTO: 0.9 % — SIGNIFICANT CHANGE UP (ref 0–1.5)
LYMPHOCYTES # BLD AUTO: 0.68 K/UL — LOW (ref 1–3.3)
LYMPHOCYTES # BLD AUTO: 21.5 % — SIGNIFICANT CHANGE UP (ref 13–44)
MCHC RBC-ENTMCNC: 31.2 PG — SIGNIFICANT CHANGE UP (ref 27–34)
MCHC RBC-ENTMCNC: 33.3 G/DL — SIGNIFICANT CHANGE UP (ref 32–36)
MCV RBC AUTO: 93.7 FL — SIGNIFICANT CHANGE UP (ref 80–100)
MONOCYTES # BLD AUTO: 0.36 K/UL — SIGNIFICANT CHANGE UP (ref 0–0.9)
MONOCYTES NFR BLD AUTO: 11.4 % — SIGNIFICANT CHANGE UP (ref 2–14)
NEUTROPHILS # BLD AUTO: 2.02 K/UL — SIGNIFICANT CHANGE UP (ref 1.8–7.4)
NEUTROPHILS NFR BLD AUTO: 63.6 % — SIGNIFICANT CHANGE UP (ref 43–77)
NRBC # BLD: 0 /100 WBCS — SIGNIFICANT CHANGE UP (ref 0–0)
PLATELET # BLD AUTO: 173 K/UL — SIGNIFICANT CHANGE UP (ref 150–400)
RBC # BLD: 4.43 M/UL — SIGNIFICANT CHANGE UP (ref 3.8–5.2)
RBC # FLD: 13.1 % — SIGNIFICANT CHANGE UP (ref 10.3–14.5)
WBC # BLD: 3.17 K/UL — LOW (ref 3.8–10.5)
WBC # FLD AUTO: 3.17 K/UL — LOW (ref 3.8–10.5)

## 2020-12-08 PROCEDURE — 99072 ADDL SUPL MATRL&STAF TM PHE: CPT

## 2020-12-08 PROCEDURE — 99215 OFFICE O/P EST HI 40 MIN: CPT

## 2020-12-08 RX ORDER — DEXAMETHASONE 4 MG/1
4 TABLET ORAL
Qty: 60 | Refills: 0 | Status: DISCONTINUED | COMMUNITY
Start: 2020-10-27 | End: 2020-12-08

## 2020-12-08 NOTE — HISTORY OF PRESENT ILLNESS
[FreeTextEntry1] : Delfina Orona is a 41 yo female who presented at this office for a neuro oncology follow up.\par In brief\par 3/2020 -Developed bifrontal headaches - initially she attributed these to wearing a mask at the beginning of the pandemic. Her symptoms progressed - she began to get lost when driving and had some confusion with tasks that were very ordinary. Headaches also escalated - most significantly post-exercise - and became unrelieved by Tylenol. Her  brought her to the ER at [facility] in early May where neuroimaging was abnormal\par 5/13/2020- Transferred to [facility]. 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 There is also a second area of more solid enhancement - not connected by flair imaging - in the right parahippocampal region measuring about 1 cm. Post-operative MRI scan on 5/14 showed a large right parietal resection cavity with some medial residual enhancement. The right parahippocampal region was unchanged. Patient was able to be discharged home on 5/15/2020. She was discharged on a Decadron taper, Pantoprazole, and Keppra 500 mg bid. There is no history of seizure.\par 6/4/2020- RT started. Chemo was prescribed by Neurosurgeon\par 6/10- Increase in headaches which prompted to repeat the MRI. This scan shows increased enhancement in the dominant lesion crossing into the left side of the posterior corpus callosum. The parahippocampal lesion is slightly bigger - measuring 1.5 cm vs. 1.0 cm.\par RT dates - 6/14 -7/16/2020\par First cycle of TMZ - 9/5 -9/9\par 103 - Second cycle\par 10/27- MRI showed POD. Plan was made to Rx with IV Irinotecan along with IV Avastin\par 11/10/2020- First dose of Irinotecan and IV Avastin\par Denies seizures, increased weakness. Patient has stopped the Decadron - she does not know when.\par She denies any headaches. She is working and commuting.

## 2020-12-08 NOTE — DISCUSSION/SUMMARY
[FreeTextEntry1] : She is neurologically stable - plan is to continue IV Avastin and Irinotecan - follow up 1st week of January with a new MRI scan. If she has any issues in the interim, she knows to call me.

## 2020-12-18 ENCOUNTER — OUTPATIENT (OUTPATIENT)
Dept: OUTPATIENT SERVICES | Facility: HOSPITAL | Age: 40
LOS: 1 days | Discharge: HOME | End: 2020-12-18

## 2020-12-18 DIAGNOSIS — C71.9 MALIGNANT NEOPLASM OF BRAIN, UNSPECIFIED: ICD-10-CM

## 2020-12-22 ENCOUNTER — RESULT REVIEW (OUTPATIENT)
Age: 40
End: 2020-12-22

## 2020-12-22 ENCOUNTER — LABORATORY RESULT (OUTPATIENT)
Age: 40
End: 2020-12-22

## 2020-12-22 ENCOUNTER — APPOINTMENT (OUTPATIENT)
Dept: INFUSION THERAPY | Facility: HOSPITAL | Age: 40
End: 2020-12-22

## 2020-12-22 LAB
BASOPHILS # BLD AUTO: 0.04 K/UL — SIGNIFICANT CHANGE UP (ref 0–0.2)
BASOPHILS NFR BLD AUTO: 1 % — SIGNIFICANT CHANGE UP (ref 0–2)
EOSINOPHIL # BLD AUTO: 0.05 K/UL — SIGNIFICANT CHANGE UP (ref 0–0.5)
EOSINOPHIL NFR BLD AUTO: 1.3 % — SIGNIFICANT CHANGE UP (ref 0–6)
HCT VFR BLD CALC: 42.4 % — SIGNIFICANT CHANGE UP (ref 34.5–45)
HGB BLD-MCNC: 14 G/DL — SIGNIFICANT CHANGE UP (ref 11.5–15.5)
IMM GRANULOCYTES NFR BLD AUTO: 1 % — SIGNIFICANT CHANGE UP (ref 0–1.5)
LYMPHOCYTES # BLD AUTO: 0.96 K/UL — LOW (ref 1–3.3)
LYMPHOCYTES # BLD AUTO: 24.7 % — SIGNIFICANT CHANGE UP (ref 13–44)
MCHC RBC-ENTMCNC: 31.3 PG — SIGNIFICANT CHANGE UP (ref 27–34)
MCHC RBC-ENTMCNC: 33 G/DL — SIGNIFICANT CHANGE UP (ref 32–36)
MCV RBC AUTO: 94.6 FL — SIGNIFICANT CHANGE UP (ref 80–100)
MONOCYTES # BLD AUTO: 0.47 K/UL — SIGNIFICANT CHANGE UP (ref 0–0.9)
MONOCYTES NFR BLD AUTO: 12.1 % — SIGNIFICANT CHANGE UP (ref 2–14)
NEUTROPHILS # BLD AUTO: 2.33 K/UL — SIGNIFICANT CHANGE UP (ref 1.8–7.4)
NEUTROPHILS NFR BLD AUTO: 59.9 % — SIGNIFICANT CHANGE UP (ref 43–77)
NRBC # BLD: 0 /100 WBCS — SIGNIFICANT CHANGE UP (ref 0–0)
PLATELET # BLD AUTO: 159 K/UL — SIGNIFICANT CHANGE UP (ref 150–400)
RBC # BLD: 4.48 M/UL — SIGNIFICANT CHANGE UP (ref 3.8–5.2)
RBC # FLD: 12.7 % — SIGNIFICANT CHANGE UP (ref 10.3–14.5)
WBC # BLD: 3.89 K/UL — SIGNIFICANT CHANGE UP (ref 3.8–10.5)
WBC # FLD AUTO: 3.89 K/UL — SIGNIFICANT CHANGE UP (ref 3.8–10.5)

## 2020-12-23 DIAGNOSIS — R11.2 NAUSEA WITH VOMITING, UNSPECIFIED: ICD-10-CM

## 2020-12-23 DIAGNOSIS — Z51.11 ENCOUNTER FOR ANTINEOPLASTIC CHEMOTHERAPY: ICD-10-CM

## 2021-01-03 ENCOUNTER — OUTPATIENT (OUTPATIENT)
Dept: OUTPATIENT SERVICES | Facility: HOSPITAL | Age: 41
LOS: 1 days | End: 2021-01-03
Payer: COMMERCIAL

## 2021-01-03 ENCOUNTER — RESULT REVIEW (OUTPATIENT)
Age: 41
End: 2021-01-03

## 2021-01-03 ENCOUNTER — APPOINTMENT (OUTPATIENT)
Dept: MRI IMAGING | Facility: IMAGING CENTER | Age: 41
End: 2021-01-03
Payer: COMMERCIAL

## 2021-01-03 DIAGNOSIS — C71.9 MALIGNANT NEOPLASM OF BRAIN, UNSPECIFIED: ICD-10-CM

## 2021-01-03 PROCEDURE — 70553 MRI BRAIN STEM W/O & W/DYE: CPT

## 2021-01-03 PROCEDURE — 70553 MRI BRAIN STEM W/O & W/DYE: CPT | Mod: 26

## 2021-01-05 ENCOUNTER — APPOINTMENT (OUTPATIENT)
Dept: INFUSION THERAPY | Facility: HOSPITAL | Age: 41
End: 2021-01-05

## 2021-01-05 ENCOUNTER — APPOINTMENT (OUTPATIENT)
Dept: NEUROLOGY | Facility: CLINIC | Age: 41
End: 2021-01-05
Payer: COMMERCIAL

## 2021-01-05 ENCOUNTER — RESULT REVIEW (OUTPATIENT)
Age: 41
End: 2021-01-05

## 2021-01-05 ENCOUNTER — LABORATORY RESULT (OUTPATIENT)
Age: 41
End: 2021-01-05

## 2021-01-05 VITALS
OXYGEN SATURATION: 99 % | SYSTOLIC BLOOD PRESSURE: 141 MMHG | RESPIRATION RATE: 16 BRPM | DIASTOLIC BLOOD PRESSURE: 93 MMHG | HEART RATE: 62 BPM | WEIGHT: 135 LBS | BODY MASS INDEX: 23.05 KG/M2 | TEMPERATURE: 97.8 F | HEIGHT: 64 IN

## 2021-01-05 LAB
BASOPHILS # BLD AUTO: 0.03 K/UL — SIGNIFICANT CHANGE UP (ref 0–0.2)
BASOPHILS NFR BLD AUTO: 0.8 % — SIGNIFICANT CHANGE UP (ref 0–2)
EOSINOPHIL # BLD AUTO: 0.09 K/UL — SIGNIFICANT CHANGE UP (ref 0–0.5)
EOSINOPHIL NFR BLD AUTO: 2.5 % — SIGNIFICANT CHANGE UP (ref 0–6)
HCT VFR BLD CALC: 40.4 % — SIGNIFICANT CHANGE UP (ref 34.5–45)
HGB BLD-MCNC: 13.9 G/DL — SIGNIFICANT CHANGE UP (ref 11.5–15.5)
IMM GRANULOCYTES NFR BLD AUTO: 0.6 % — SIGNIFICANT CHANGE UP (ref 0–1.5)
LYMPHOCYTES # BLD AUTO: 0.88 K/UL — LOW (ref 1–3.3)
LYMPHOCYTES # BLD AUTO: 24.8 % — SIGNIFICANT CHANGE UP (ref 13–44)
MCHC RBC-ENTMCNC: 32.1 PG — SIGNIFICANT CHANGE UP (ref 27–34)
MCHC RBC-ENTMCNC: 34.4 G/DL — SIGNIFICANT CHANGE UP (ref 32–36)
MCV RBC AUTO: 93.3 FL — SIGNIFICANT CHANGE UP (ref 80–100)
MONOCYTES # BLD AUTO: 0.34 K/UL — SIGNIFICANT CHANGE UP (ref 0–0.9)
MONOCYTES NFR BLD AUTO: 9.6 % — SIGNIFICANT CHANGE UP (ref 2–14)
NEUTROPHILS # BLD AUTO: 2.19 K/UL — SIGNIFICANT CHANGE UP (ref 1.8–7.4)
NEUTROPHILS NFR BLD AUTO: 61.7 % — SIGNIFICANT CHANGE UP (ref 43–77)
NRBC # BLD: 0 /100 WBCS — SIGNIFICANT CHANGE UP (ref 0–0)
PLATELET # BLD AUTO: 156 K/UL — SIGNIFICANT CHANGE UP (ref 150–400)
RBC # BLD: 4.33 M/UL — SIGNIFICANT CHANGE UP (ref 3.8–5.2)
RBC # FLD: 12.5 % — SIGNIFICANT CHANGE UP (ref 10.3–14.5)
WBC # BLD: 3.55 K/UL — LOW (ref 3.8–10.5)
WBC # FLD AUTO: 3.55 K/UL — LOW (ref 3.8–10.5)

## 2021-01-05 PROCEDURE — 99072 ADDL SUPL MATRL&STAF TM PHE: CPT

## 2021-01-05 PROCEDURE — 99215 OFFICE O/P EST HI 40 MIN: CPT

## 2021-01-05 NOTE — DISCUSSION/SUMMARY
[FreeTextEntry1] : Patient seen and examined\par Clinically and radiographically stable\par Continue Irinotecan and IV Avastin RX\par Will do a clinical follow up in a month\par In the interim, if any questions patient knows to call me

## 2021-01-05 NOTE — PHYSICAL EXAM
[General Appearance - Alert] : alert [General Appearance - In No Acute Distress] : in no acute distress [] : no respiratory distress [Respiration, Rhythm And Depth] : normal respiratory rhythm and effort [Exaggerated Use Of Accessory Muscles For Inspiration] : no accessory muscle use [FreeTextEntry1] : Patient is awake and alert - oriented and fluent - irritable but appropriate.\par PERRL, EOMI,\par Left VF defect - LQ\par No sensory or visual neglect to exam\par No drift\par FFM are equal bilaterally\par Strength is full throughout\par Mild unsteadiness with gait (in heels - veers slightly to the left).

## 2021-01-05 NOTE — HISTORY OF PRESENT ILLNESS
[FreeTextEntry1] : Delfina Orona is a 41 yo female who presented at this office for a neuro oncology follow up.\par In brief\par 3/2020 -Developed bifrontal headaches - initially she attributed these to wearing a mask at the beginning of the pandemic. Her symptoms progressed - she began to get lost when driving and had some confusion with tasks that were very ordinary. Headaches also escalated - most significantly post-exercise - and became unrelieved by Tylenol. Her  brought her to the ER at [facility] in early May where neuroimaging was abnormal\par 5/13/2020- Transferred to [facility]. 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 There is also a second area of more solid enhancement - not connected by flair imaging - in the right parahippocampal region measuring about 1 cm. Post-operative MRI scan on 5/14 showed a large right parietal resection cavity with some medial residual enhancement. The right parahippocampal region was unchanged. Patient was able to be discharged home on 5/15/2020. She was discharged on a Decadron taper, Pantoprazole, and Keppra 500 mg bid. There is no history of seizure.\par 6/4/2020- RT started. Chemo was prescribed by Neurosurgeon\par 6/10- Increase in headaches which prompted to repeat the MRI. This scan shows increased enhancement in the dominant lesion crossing into the left side of the posterior corpus callosum. The parahippocampal lesion is slightly bigger - measuring 1.5 cm vs. 1.0 cm.\par RT dates - 6/14 -7/16/2020\par First cycle of TMZ - 9/5 -9/9\par 103 - Second cycle\par 10/27- MRI showed POD. Plan was made to Rx with IV Irinotecan along with IV Avastin\par 11/10/2020- First dose of Irinotecan and IV Avastin\par Denies seizures, increased weakness.

## 2021-01-13 ENCOUNTER — OUTPATIENT (OUTPATIENT)
Dept: OUTPATIENT SERVICES | Facility: HOSPITAL | Age: 41
LOS: 1 days | Discharge: HOME | End: 2021-01-13

## 2021-01-13 DIAGNOSIS — C71.9 MALIGNANT NEOPLASM OF BRAIN, UNSPECIFIED: ICD-10-CM

## 2021-01-19 ENCOUNTER — LABORATORY RESULT (OUTPATIENT)
Age: 41
End: 2021-01-19

## 2021-01-19 ENCOUNTER — RESULT REVIEW (OUTPATIENT)
Age: 41
End: 2021-01-19

## 2021-01-19 ENCOUNTER — APPOINTMENT (OUTPATIENT)
Dept: INFUSION THERAPY | Facility: HOSPITAL | Age: 41
End: 2021-01-19

## 2021-01-19 LAB
BASOPHILS # BLD AUTO: 0.04 K/UL — SIGNIFICANT CHANGE UP (ref 0–0.2)
BASOPHILS NFR BLD AUTO: 1 % — SIGNIFICANT CHANGE UP (ref 0–2)
EOSINOPHIL # BLD AUTO: 0.08 K/UL — SIGNIFICANT CHANGE UP (ref 0–0.5)
EOSINOPHIL NFR BLD AUTO: 2.1 % — SIGNIFICANT CHANGE UP (ref 0–6)
HCT VFR BLD CALC: 42.2 % — SIGNIFICANT CHANGE UP (ref 34.5–45)
HGB BLD-MCNC: 14.1 G/DL — SIGNIFICANT CHANGE UP (ref 11.5–15.5)
IMM GRANULOCYTES NFR BLD AUTO: 1 % — SIGNIFICANT CHANGE UP (ref 0–1.5)
LYMPHOCYTES # BLD AUTO: 1.03 K/UL — SIGNIFICANT CHANGE UP (ref 1–3.3)
LYMPHOCYTES # BLD AUTO: 26.5 % — SIGNIFICANT CHANGE UP (ref 13–44)
MCHC RBC-ENTMCNC: 31.2 PG — SIGNIFICANT CHANGE UP (ref 27–34)
MCHC RBC-ENTMCNC: 33.4 G/DL — SIGNIFICANT CHANGE UP (ref 32–36)
MCV RBC AUTO: 93.4 FL — SIGNIFICANT CHANGE UP (ref 80–100)
MONOCYTES # BLD AUTO: 0.42 K/UL — SIGNIFICANT CHANGE UP (ref 0–0.9)
MONOCYTES NFR BLD AUTO: 10.8 % — SIGNIFICANT CHANGE UP (ref 2–14)
NEUTROPHILS # BLD AUTO: 2.28 K/UL — SIGNIFICANT CHANGE UP (ref 1.8–7.4)
NEUTROPHILS NFR BLD AUTO: 58.6 % — SIGNIFICANT CHANGE UP (ref 43–77)
NRBC # BLD: 0 /100 WBCS — SIGNIFICANT CHANGE UP (ref 0–0)
PLATELET # BLD AUTO: 191 K/UL — SIGNIFICANT CHANGE UP (ref 150–400)
RBC # BLD: 4.52 M/UL — SIGNIFICANT CHANGE UP (ref 3.8–5.2)
RBC # FLD: 12.3 % — SIGNIFICANT CHANGE UP (ref 10.3–14.5)
WBC # BLD: 3.89 K/UL — SIGNIFICANT CHANGE UP (ref 3.8–10.5)
WBC # FLD AUTO: 3.89 K/UL — SIGNIFICANT CHANGE UP (ref 3.8–10.5)

## 2021-01-20 DIAGNOSIS — Z51.11 ENCOUNTER FOR ANTINEOPLASTIC CHEMOTHERAPY: ICD-10-CM

## 2021-01-20 DIAGNOSIS — R11.2 NAUSEA WITH VOMITING, UNSPECIFIED: ICD-10-CM

## 2021-02-02 ENCOUNTER — LABORATORY RESULT (OUTPATIENT)
Age: 41
End: 2021-02-02

## 2021-02-02 ENCOUNTER — RESULT REVIEW (OUTPATIENT)
Age: 41
End: 2021-02-02

## 2021-02-02 ENCOUNTER — APPOINTMENT (OUTPATIENT)
Dept: NEUROLOGY | Facility: CLINIC | Age: 41
End: 2021-02-02
Payer: COMMERCIAL

## 2021-02-02 ENCOUNTER — APPOINTMENT (OUTPATIENT)
Dept: INFUSION THERAPY | Facility: HOSPITAL | Age: 41
End: 2021-02-02

## 2021-02-02 VITALS
WEIGHT: 135 LBS | SYSTOLIC BLOOD PRESSURE: 153 MMHG | DIASTOLIC BLOOD PRESSURE: 87 MMHG | TEMPERATURE: 98.8 F | OXYGEN SATURATION: 98 % | RESPIRATION RATE: 16 BRPM | HEART RATE: 60 BPM | BODY MASS INDEX: 23.05 KG/M2 | HEIGHT: 64 IN

## 2021-02-02 LAB
BASOPHILS # BLD AUTO: 0.03 K/UL — SIGNIFICANT CHANGE UP (ref 0–0.2)
BASOPHILS NFR BLD AUTO: 0.9 % — SIGNIFICANT CHANGE UP (ref 0–2)
EOSINOPHIL # BLD AUTO: 0.06 K/UL — SIGNIFICANT CHANGE UP (ref 0–0.5)
EOSINOPHIL NFR BLD AUTO: 1.7 % — SIGNIFICANT CHANGE UP (ref 0–6)
HCT VFR BLD CALC: 41.3 % — SIGNIFICANT CHANGE UP (ref 34.5–45)
HGB BLD-MCNC: 14.1 G/DL — SIGNIFICANT CHANGE UP (ref 11.5–15.5)
IMM GRANULOCYTES NFR BLD AUTO: 1.1 % — SIGNIFICANT CHANGE UP (ref 0–1.5)
LYMPHOCYTES # BLD AUTO: 0.88 K/UL — LOW (ref 1–3.3)
LYMPHOCYTES # BLD AUTO: 25.3 % — SIGNIFICANT CHANGE UP (ref 13–44)
MCHC RBC-ENTMCNC: 31.8 PG — SIGNIFICANT CHANGE UP (ref 27–34)
MCHC RBC-ENTMCNC: 34.1 G/DL — SIGNIFICANT CHANGE UP (ref 32–36)
MCV RBC AUTO: 93 FL — SIGNIFICANT CHANGE UP (ref 80–100)
MONOCYTES # BLD AUTO: 0.38 K/UL — SIGNIFICANT CHANGE UP (ref 0–0.9)
MONOCYTES NFR BLD AUTO: 10.9 % — SIGNIFICANT CHANGE UP (ref 2–14)
NEUTROPHILS # BLD AUTO: 2.09 K/UL — SIGNIFICANT CHANGE UP (ref 1.8–7.4)
NEUTROPHILS NFR BLD AUTO: 60.1 % — SIGNIFICANT CHANGE UP (ref 43–77)
NRBC # BLD: 0 /100 WBCS — SIGNIFICANT CHANGE UP (ref 0–0)
PLATELET # BLD AUTO: 183 K/UL — SIGNIFICANT CHANGE UP (ref 150–400)
RBC # BLD: 4.44 M/UL — SIGNIFICANT CHANGE UP (ref 3.8–5.2)
RBC # FLD: 12.5 % — SIGNIFICANT CHANGE UP (ref 10.3–14.5)
WBC # BLD: 3.48 K/UL — LOW (ref 3.8–10.5)
WBC # FLD AUTO: 3.48 K/UL — LOW (ref 3.8–10.5)

## 2021-02-02 PROCEDURE — 99215 OFFICE O/P EST HI 40 MIN: CPT

## 2021-02-02 PROCEDURE — 99072 ADDL SUPL MATRL&STAF TM PHE: CPT

## 2021-02-03 NOTE — HISTORY OF PRESENT ILLNESS
[FreeTextEntry1] : Delfina Orona is a 41 yo female who presented at this office for a neuro oncology follow up.\par In brief\par 3/2020 -Developed bifrontal headaches - initially she attributed these to wearing a mask at the beginning of the pandemic. Her symptoms progressed - she began to get lost when driving and had some confusion with tasks that were very ordinary. Headaches also escalated - most significantly post-exercise - and became unrelieved by Tylenol. Her  brought her to the ER at [facility] in early May where neuroimaging was abnormal\par 5/13/2020- Transferred to [facility]. 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 There is also a second area of more solid enhancement - not connected by flair imaging - in the right parahippocampal region measuring about 1 cm. Post-operative MRI scan on 5/14 showed a large right parietal resection cavity with some medial residual enhancement. The right parahippocampal region was unchanged. Patient was able to be discharged home on 5/15/2020. She was discharged on a Decadron taper, Pantoprazole, and Keppra 500 mg bid. There is no history of seizure.\par 6/4/2020- RT started. Chemo was prescribed by Neurosurgeon\par 6/10- Increase in headaches which prompted to repeat the MRI. This scan shows increased enhancement in the dominant lesion crossing into the left side of the posterior corpus callosum. The parahippocampal lesion is slightly bigger - measuring 1.5 cm vs. 1.0 cm.\par RT dates - 6/14 -7/16/2020\par First cycle of TMZ - 9/5 -9/9\par 103 - Second cycle\par 10/27- MRI showed POD. Plan was made to Rx with IV Irinotecan along with IV Avastin\par 11/10/2020- First dose of Irinotecan and IV Avastin\par 1/5/2021 - MRI stable.\par 2/2/2021- Patient here for a follow up. Offers no new complaints\par Denies seizures, increased weakness. \par

## 2021-02-03 NOTE — DISCUSSION/SUMMARY
[FreeTextEntry1] : Patient seen and examined\par Clinically stable\par Will do an MRI along with a follow up on March 2, 2021\par In the interim, if any concerns patient knows to call our office\par

## 2021-02-03 NOTE — PHYSICAL EXAM
[General Appearance - Alert] : alert [General Appearance - In No Acute Distress] : in no acute distress [General Appearance - Well Nourished] : well nourished [Oriented To Time, Place, And Person] : oriented to person, place, and time [] : no respiratory distress [Respiration, Rhythm And Depth] : normal respiratory rhythm and effort [Exaggerated Use Of Accessory Muscles For Inspiration] : no accessory muscle use [FreeTextEntry1] : Patient is awake and alert - oriented and fluent \par PERRL, EOMI,\par Left VF defect - LQ\par No sensory or visual neglect to exam\par No drift\par FFM are equal bilaterally\par Strength is full throughout\par Mild unsteadiness with gait (in heels - veers slightly to the left).

## 2021-02-11 ENCOUNTER — OUTPATIENT (OUTPATIENT)
Dept: OUTPATIENT SERVICES | Facility: HOSPITAL | Age: 41
LOS: 1 days | Discharge: HOME | End: 2021-02-11

## 2021-02-11 DIAGNOSIS — C71.9 MALIGNANT NEOPLASM OF BRAIN, UNSPECIFIED: ICD-10-CM

## 2021-02-16 ENCOUNTER — RESULT REVIEW (OUTPATIENT)
Age: 41
End: 2021-02-16

## 2021-02-16 ENCOUNTER — LABORATORY RESULT (OUTPATIENT)
Age: 41
End: 2021-02-16

## 2021-02-16 ENCOUNTER — APPOINTMENT (OUTPATIENT)
Dept: INFUSION THERAPY | Facility: HOSPITAL | Age: 41
End: 2021-02-16

## 2021-02-16 LAB
BASOPHILS # BLD AUTO: 0.04 K/UL — SIGNIFICANT CHANGE UP (ref 0–0.2)
BASOPHILS NFR BLD AUTO: 1.2 % — SIGNIFICANT CHANGE UP (ref 0–2)
EOSINOPHIL # BLD AUTO: 0.05 K/UL — SIGNIFICANT CHANGE UP (ref 0–0.5)
EOSINOPHIL NFR BLD AUTO: 1.5 % — SIGNIFICANT CHANGE UP (ref 0–6)
HCT VFR BLD CALC: 41.2 % — SIGNIFICANT CHANGE UP (ref 34.5–45)
HGB BLD-MCNC: 13.8 G/DL — SIGNIFICANT CHANGE UP (ref 11.5–15.5)
IMM GRANULOCYTES NFR BLD AUTO: 1.5 % — SIGNIFICANT CHANGE UP (ref 0–1.5)
LYMPHOCYTES # BLD AUTO: 0.79 K/UL — LOW (ref 1–3.3)
LYMPHOCYTES # BLD AUTO: 23.7 % — SIGNIFICANT CHANGE UP (ref 13–44)
MCHC RBC-ENTMCNC: 31.4 PG — SIGNIFICANT CHANGE UP (ref 27–34)
MCHC RBC-ENTMCNC: 33.5 G/DL — SIGNIFICANT CHANGE UP (ref 32–36)
MCV RBC AUTO: 93.6 FL — SIGNIFICANT CHANGE UP (ref 80–100)
MONOCYTES # BLD AUTO: 0.34 K/UL — SIGNIFICANT CHANGE UP (ref 0–0.9)
MONOCYTES NFR BLD AUTO: 10.2 % — SIGNIFICANT CHANGE UP (ref 2–14)
NEUTROPHILS # BLD AUTO: 2.07 K/UL — SIGNIFICANT CHANGE UP (ref 1.8–7.4)
NEUTROPHILS NFR BLD AUTO: 61.9 % — SIGNIFICANT CHANGE UP (ref 43–77)
NRBC # BLD: 0 /100 WBCS — SIGNIFICANT CHANGE UP (ref 0–0)
PLATELET # BLD AUTO: 164 K/UL — SIGNIFICANT CHANGE UP (ref 150–400)
RBC # BLD: 4.4 M/UL — SIGNIFICANT CHANGE UP (ref 3.8–5.2)
RBC # FLD: 12.5 % — SIGNIFICANT CHANGE UP (ref 10.3–14.5)
WBC # BLD: 3.34 K/UL — LOW (ref 3.8–10.5)
WBC # FLD AUTO: 3.34 K/UL — LOW (ref 3.8–10.5)

## 2021-02-17 DIAGNOSIS — Z51.11 ENCOUNTER FOR ANTINEOPLASTIC CHEMOTHERAPY: ICD-10-CM

## 2021-02-17 DIAGNOSIS — R11.2 NAUSEA WITH VOMITING, UNSPECIFIED: ICD-10-CM

## 2021-03-02 ENCOUNTER — RESULT REVIEW (OUTPATIENT)
Age: 41
End: 2021-03-02

## 2021-03-02 ENCOUNTER — LABORATORY RESULT (OUTPATIENT)
Age: 41
End: 2021-03-02

## 2021-03-02 ENCOUNTER — APPOINTMENT (OUTPATIENT)
Dept: INFUSION THERAPY | Facility: HOSPITAL | Age: 41
End: 2021-03-02

## 2021-03-02 LAB
BASOPHILS # BLD AUTO: 0.02 K/UL — SIGNIFICANT CHANGE UP (ref 0–0.2)
BASOPHILS NFR BLD AUTO: 0.6 % — SIGNIFICANT CHANGE UP (ref 0–2)
EOSINOPHIL # BLD AUTO: 0.07 K/UL — SIGNIFICANT CHANGE UP (ref 0–0.5)
EOSINOPHIL NFR BLD AUTO: 1.9 % — SIGNIFICANT CHANGE UP (ref 0–6)
HCT VFR BLD CALC: 41.7 % — SIGNIFICANT CHANGE UP (ref 34.5–45)
HGB BLD-MCNC: 14 G/DL — SIGNIFICANT CHANGE UP (ref 11.5–15.5)
IMM GRANULOCYTES NFR BLD AUTO: 0.6 % — SIGNIFICANT CHANGE UP (ref 0–1.5)
LYMPHOCYTES # BLD AUTO: 0.85 K/UL — LOW (ref 1–3.3)
LYMPHOCYTES # BLD AUTO: 23.4 % — SIGNIFICANT CHANGE UP (ref 13–44)
MCHC RBC-ENTMCNC: 31.5 PG — SIGNIFICANT CHANGE UP (ref 27–34)
MCHC RBC-ENTMCNC: 33.6 G/DL — SIGNIFICANT CHANGE UP (ref 32–36)
MCV RBC AUTO: 93.7 FL — SIGNIFICANT CHANGE UP (ref 80–100)
MONOCYTES # BLD AUTO: 0.41 K/UL — SIGNIFICANT CHANGE UP (ref 0–0.9)
MONOCYTES NFR BLD AUTO: 11.3 % — SIGNIFICANT CHANGE UP (ref 2–14)
NEUTROPHILS # BLD AUTO: 2.26 K/UL — SIGNIFICANT CHANGE UP (ref 1.8–7.4)
NEUTROPHILS NFR BLD AUTO: 62.2 % — SIGNIFICANT CHANGE UP (ref 43–77)
NRBC # BLD: 0 /100 WBCS — SIGNIFICANT CHANGE UP (ref 0–0)
PLATELET # BLD AUTO: 172 K/UL — SIGNIFICANT CHANGE UP (ref 150–400)
RBC # BLD: 4.45 M/UL — SIGNIFICANT CHANGE UP (ref 3.8–5.2)
RBC # FLD: 12.1 % — SIGNIFICANT CHANGE UP (ref 10.3–14.5)
WBC # BLD: 3.63 K/UL — LOW (ref 3.8–10.5)
WBC # FLD AUTO: 3.63 K/UL — LOW (ref 3.8–10.5)

## 2021-03-08 ENCOUNTER — APPOINTMENT (OUTPATIENT)
Dept: NEUROLOGY | Facility: CLINIC | Age: 41
End: 2021-03-08

## 2021-03-11 ENCOUNTER — OUTPATIENT (OUTPATIENT)
Dept: OUTPATIENT SERVICES | Facility: HOSPITAL | Age: 41
LOS: 1 days | Discharge: HOME | End: 2021-03-11

## 2021-03-11 DIAGNOSIS — C71.9 MALIGNANT NEOPLASM OF BRAIN, UNSPECIFIED: ICD-10-CM

## 2021-03-14 ENCOUNTER — APPOINTMENT (OUTPATIENT)
Dept: MRI IMAGING | Facility: IMAGING CENTER | Age: 41
End: 2021-03-14
Payer: COMMERCIAL

## 2021-03-14 ENCOUNTER — OUTPATIENT (OUTPATIENT)
Dept: OUTPATIENT SERVICES | Facility: HOSPITAL | Age: 41
LOS: 1 days | End: 2021-03-14
Payer: COMMERCIAL

## 2021-03-14 DIAGNOSIS — C71.9 MALIGNANT NEOPLASM OF BRAIN, UNSPECIFIED: ICD-10-CM

## 2021-03-14 DIAGNOSIS — Z00.8 ENCOUNTER FOR OTHER GENERAL EXAMINATION: ICD-10-CM

## 2021-03-14 PROCEDURE — 70553 MRI BRAIN STEM W/O & W/DYE: CPT

## 2021-03-14 PROCEDURE — 70553 MRI BRAIN STEM W/O & W/DYE: CPT | Mod: 26

## 2021-03-14 PROCEDURE — A9585: CPT

## 2021-03-16 ENCOUNTER — APPOINTMENT (OUTPATIENT)
Dept: INFUSION THERAPY | Facility: HOSPITAL | Age: 41
End: 2021-03-16

## 2021-03-16 ENCOUNTER — LABORATORY RESULT (OUTPATIENT)
Age: 41
End: 2021-03-16

## 2021-03-16 ENCOUNTER — APPOINTMENT (OUTPATIENT)
Dept: NEUROLOGY | Facility: CLINIC | Age: 41
End: 2021-03-16
Payer: COMMERCIAL

## 2021-03-16 ENCOUNTER — APPOINTMENT (OUTPATIENT)
Dept: NEUROLOGY | Facility: CLINIC | Age: 41
End: 2021-03-16

## 2021-03-16 VITALS
WEIGHT: 120 LBS | RESPIRATION RATE: 18 BRPM | TEMPERATURE: 97.7 F | OXYGEN SATURATION: 99 % | HEART RATE: 67 BPM | SYSTOLIC BLOOD PRESSURE: 133 MMHG | BODY MASS INDEX: 20.49 KG/M2 | HEIGHT: 64 IN | DIASTOLIC BLOOD PRESSURE: 83 MMHG

## 2021-03-16 PROCEDURE — 99215 OFFICE O/P EST HI 40 MIN: CPT

## 2021-03-16 PROCEDURE — 99072 ADDL SUPL MATRL&STAF TM PHE: CPT

## 2021-03-17 NOTE — PHYSICAL EXAM
[General Appearance - Alert] : alert [General Appearance - In No Acute Distress] : in no acute distress [General Appearance - Well Nourished] : well nourished [Oriented To Time, Place, And Person] : oriented to person, place, and time [] : no respiratory distress [Respiration, Rhythm And Depth] : normal respiratory rhythm and effort [Exaggerated Use Of Accessory Muscles For Inspiration] : no accessory muscle use [FreeTextEntry1] : Patient is awake and alert - oriented and fluent - irritable but appropriate.\par PERRL, EOMI,\par Left VF defect - LQ\par Able to say word HOUSE forwards and backwards\par Able to do simple calculations\par Memory at 3 minutes 2/3 with cues ( Ball, chair, apple)\par No neglect noted\par BARRAGAN X 4 with good strength. No drift noted\par Mild unsteadiness with gait (in heels - veers slightly to the left).

## 2021-03-17 NOTE — HISTORY OF PRESENT ILLNESS
[FreeTextEntry1] : Delfina Orona is a 39 yo female who presented at this office for a neuro oncology follow up.\par In brief\par 3/2020 -Developed bifrontal headaches - initially she attributed these to wearing a mask at the beginning of the pandemic. Her symptoms progressed - she began to get lost when driving and had some confusion with tasks that were very ordinary. Headaches also escalated - most significantly post-exercise - and became unrelieved by Tylenol. Her  brought her to the ER at [facility] in early May where neuroimaging was abnormal\par 5/13/2020- Transferred to [facility]. 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 There is also a second area of more solid enhancement - not connected by flair imaging - in the right parahippocampal region measuring about 1 cm. Post-operative MRI scan on 5/14 showed a large right parietal resection cavity with some medial residual enhancement. The right parahippocampal region was unchanged. Patient was able to be discharged home on 5/15/2020. She was discharged on a Decadron taper, Pantoprazole, and Keppra 500 mg bid. There is no history of seizure.\par 6/4/2020- RT started. Chemo was prescribed by Neurosurgeon\par 6/10- Increase in headaches which prompted to repeat the MRI. This scan shows increased enhancement in the dominant lesion crossing into the left side of the posterior corpus callosum. The parahippocampal lesion is slightly bigger - measuring 1.5 cm vs. 1.0 cm.\par RT dates - 6/14 -7/16/2020\par First cycle of TMZ - 9/5 -9/9\par 103 - Second cycle\par 10/27- MRI showed POD. Plan was made to Rx with IV Irinotecan along with IV Avastin\par 11/10/2020- First dose of Irinotecan and IV Avastin\par 1/5/2021 - MRI stable.\par 3/16/2021 - Here for a follow up along with a new MRI. Offers no new complaints other than some numbness to the left finger tips. Completed 9 total infusions of Irinotecan along with Avastin\par Denies seizures, increased weakness. \par She continues to work. Her  notes decreased appetite (note 15 pound weight loss over 6 weeks) - unclear if she is following a specific restricted diet.

## 2021-03-17 NOTE — DISCUSSION/SUMMARY
[FreeTextEntry1] : Patient seen and examined\par Clinically not worse\par MRI performed on 3/14, personally reviewed and compared to prior exam dated 1/3. There is increased enhancement most notably in the corpus callosum.\par Recommended Clinical trial with IA Cetuximab and RT.\par Other options include switching Irinotecan to either Carboplatin or Lomustine.\par Will help arrange visit with Dr. Pcaheco's team.\par \par \par

## 2021-03-17 NOTE — DATA REVIEWED
[de-identified] : \par EXAM: MR BRAIN WAW IC\par \par \par PROCEDURE DATE: 03/14/2021\par \par \par \par INTERPRETATION: Contrast-enhanced MRI of the brain.\par \par CLINICAL INDICATION: Follow-up for malignant neoplasm\par \par TECHNIQUE: Multiplanar, multisequence MR images of the brain were obtained with images acquired prior to and following the intravenous administration of 6 cc of gadovist. Perfusion imaging was post processed with perfusion software\par \par COMPARISON: Brain MRI dated 1/3/2021\par \par FINDINGS: There is rim enhancement involving the splenium of the corpus callosum with extension into the right parietal lobe. The anterior border of enhancement now extends further into the body of the corpus callosum.. There is increasing FLAIR/T2 signal intensity in the bilateral periatrial white matter, corpus callosum and extending into the parietal lobes bilaterally. There has been no change in FLAIR/T2 signal abnormality within the right temporal lobe with a persistent a T1 hyperintense focus possibly representing mineralization mineralization and/or hemorrhage. 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 Increasing bilateral periatrial and parietal increased FLAIR/T2 signal abnormality likely representing nonenhancing neoplasm. Increasing involvement of the corpus callosum. Perfusion parameters continue to favor posttreatment related changes are corresponding with the rim enhancing component of the lesion.\par \par Unchanged signal abnormality within the right temporal lobe with unchanged focus of hemorrhage versus mineralization..\par \par \par \par \par \par \par \par \par OBINNA HITCHCOCK MD; Attending Radiologist\par This document has been electronically signed. Mar 15 2021 4:31PM

## 2021-03-19 ENCOUNTER — APPOINTMENT (OUTPATIENT)
Dept: NEUROSURGERY | Facility: CLINIC | Age: 41
End: 2021-03-19
Payer: COMMERCIAL

## 2021-03-19 VITALS
WEIGHT: 120 LBS | HEIGHT: 64 IN | HEART RATE: 68 BPM | TEMPERATURE: 97.3 F | RESPIRATION RATE: 12 BRPM | OXYGEN SATURATION: 98 % | BODY MASS INDEX: 20.49 KG/M2 | SYSTOLIC BLOOD PRESSURE: 133 MMHG | DIASTOLIC BLOOD PRESSURE: 86 MMHG

## 2021-03-19 PROCEDURE — 99072 ADDL SUPL MATRL&STAF TM PHE: CPT

## 2021-03-19 PROCEDURE — 99204 OFFICE O/P NEW MOD 45 MIN: CPT

## 2021-03-19 NOTE — HISTORY OF PRESENT ILLNESS
[FreeTextEntry1] : Delfina Orona is a 39 yo female who presented at this office for a neuro oncology follow up.\par In brief\par 3/2020 -Developed bifrontal headaches - initially she attributed these to wearing a mask at the beginning of the pandemic. Her symptoms progressed - she began to get lost when driving and had some confusion with tasks that were very ordinary. Headaches also escalated - most significantly post-exercise - and became unrelieved by Tylenol. Her  brought her to the ER at [facility] in early May where neuroimaging was abnormal\par 5/13/2020- Transferred to [facility]. MRI brain showed a peripherally enhancing mass in the right medial parietal lobe. S/p resection by Dr. Quinones.  Patient was able to be discharged home on 5/15/2020. She was discharged on a Decadron taper, Pantoprazole, and Keppra 500 mg bid. There is no history of seizure.\par 6/4/2020- RT started. Chemo was prescribed by Neurosurgeon\par 6/10- Increase in headaches which prompted to repeat the MRI. This scan shows increased enhancement in the dominant lesion crossing into the left side of the posterior corpus callosum. The parahippocampal lesion is slightly bigger - measuring 1.5 cm vs. 1.0 cm.\par RT dates - 6/14 -7/16/2020\par First cycle of TMZ - 9/5 -9/9\par 103 - Second cycle\par 10/27- MRI showed POD. Plan was made to Rx with IV Irinotecan along with IV Avastin\par 11/10/2020- First dose of Irinotecan and IV Avastin\par 1/5/2021 - MRI stable.\par 3/16/2021 - Here for a follow up along with a new MRI. Offers no new complaints other than some numbness to the left finger tips. Completed 9 total infusions of Irinotecan along with Avastin\par Denies seizures, increased weakness. \par She does acknowledge some cognitive decline and lethargy. She denies any weakness. She is not currently on any corticosteroid. She is on keppra 500mg PO  BID. \par

## 2021-03-19 NOTE — ASSESSMENT
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   The patient’s established problem of  GBM is progressing.  The differential diagnosis is radiation necrosis.  Her KPS is 90. I had a long discussion with the patient regarding the role of enrolling in our recurrent  IA cetuximab with irradiation clinical trial.  The patient and her  were extensively educated about the nature of her disease process. Therapeutic and diagnostic tests include.  The patient should see Dr. Gordon should they choose to enroll.   I have explained the alternatives, risks and benefits to the patient and she understands and agrees to proceed.  This risk of the procedure including but not limited to pain, infection, seizure, stroke, recurrence, residual disease, neurovascular injury, heart attack, pulmonary embolism, blindness, weakness, paralysis and death have been carefully explained to the patient who clearly understands and agrees to proceed.\par

## 2021-03-19 NOTE — DATA REVIEWED
[de-identified] : I have reviewed the most recent MRI 3/14/21 which shows increased enhancement and FLAIR signal change with perfusion consistent with post-treatment change

## 2021-03-19 NOTE — PHYSICAL EXAM
[General Appearance - Alert] : alert [General Appearance - In No Acute Distress] : in no acute distress [Person] : oriented to person [Place] : oriented to place [Time] : oriented to time [Motor Tone] : muscle tone was normal in all four extremities [Motor Strength] : muscle strength was normal in all four extremities [Motor Handedness Right-Handed] : the patient is right hand dominant [Balance] : balance was intact [] : no respiratory distress [Abnormal Walk] : normal gait [Sensation Tactile Decrease] : light touch was intact [Intact] : all motor groups within normal limits of strength and tone bilaterally [Neck Appearance] : the appearance of the neck was normal

## 2021-03-20 ENCOUNTER — APPOINTMENT (OUTPATIENT)
Dept: MRI IMAGING | Facility: IMAGING CENTER | Age: 41
End: 2021-03-20

## 2021-04-05 ENCOUNTER — APPOINTMENT (OUTPATIENT)
Dept: NEUROSURGERY | Facility: CLINIC | Age: 41
End: 2021-04-05
Payer: COMMERCIAL

## 2021-04-05 ENCOUNTER — LABORATORY RESULT (OUTPATIENT)
Age: 41
End: 2021-04-05

## 2021-04-05 ENCOUNTER — APPOINTMENT (OUTPATIENT)
Dept: RADIATION ONCOLOGY | Facility: CLINIC | Age: 41
End: 2021-04-05
Payer: COMMERCIAL

## 2021-04-05 VITALS
RESPIRATION RATE: 16 BRPM | TEMPERATURE: 96.9 F | DIASTOLIC BLOOD PRESSURE: 94 MMHG | BODY MASS INDEX: 20.49 KG/M2 | WEIGHT: 120 LBS | HEIGHT: 64 IN | HEART RATE: 68 BPM | OXYGEN SATURATION: 98 % | SYSTOLIC BLOOD PRESSURE: 146 MMHG

## 2021-04-05 VITALS
HEART RATE: 67 BPM | DIASTOLIC BLOOD PRESSURE: 93 MMHG | WEIGHT: 118.44 LBS | SYSTOLIC BLOOD PRESSURE: 150 MMHG | TEMPERATURE: 98 F | BODY MASS INDEX: 20.33 KG/M2 | OXYGEN SATURATION: 100 %

## 2021-04-05 DIAGNOSIS — H53.40 UNSPECIFIED VISUAL FIELD DEFECTS: ICD-10-CM

## 2021-04-05 DIAGNOSIS — C71.9 MALIGNANT NEOPLASM OF BRAIN, UNSPECIFIED: ICD-10-CM

## 2021-04-05 PROCEDURE — 99205 OFFICE O/P NEW HI 60 MIN: CPT | Mod: 25

## 2021-04-05 PROCEDURE — 99214 OFFICE O/P EST MOD 30 MIN: CPT

## 2021-04-05 PROCEDURE — 99072 ADDL SUPL MATRL&STAF TM PHE: CPT

## 2021-04-05 NOTE — HISTORY OF PRESENT ILLNESS
[FreeTextEntry1] : Delfina Oroan is a 41 yo female who presented at this office for a neuro oncology follow up.\par In brief\par 3/2020 -Developed bifrontal headaches - initially she attributed these to wearing a mask at the beginning of the pandemic. Her symptoms progressed - she began to get lost when driving and had some confusion with tasks that were very ordinary. Headaches also escalated - most significantly post-exercise - and became unrelieved by Tylenol. Her  brought her to the ER at [facility] in early May where neuroimaging was abnormal\par 5/13/2020- Transferred to [facility]. MRI brain showed a peripherally enhancing mass in the right medial parietal lobe. S/p resection by Dr. Quinones.  Patient was able to be discharged home on 5/15/2020. She was discharged on a Decadron taper, Pantoprazole, and Keppra 500 mg bid. There is no history of seizure.\par 6/4/2020- RT started. Chemo was prescribed by Neurosurgeon\par 6/10- Increase in headaches which prompted to repeat the MRI. This scan shows increased enhancement in the dominant lesion crossing into the left side of the posterior corpus callosum. The parahippocampal lesion is slightly bigger - measuring 1.5 cm vs. 1.0 cm.\par RT dates - 6/14 -7/16/2020\par First cycle of TMZ - 9/5 -9/9\par 103 - Second cycle\par 10/27- MRI showed POD. Plan was made to Rx with IV Irinotecan along with IV Avastin\par 11/10/2020- First dose of Irinotecan and IV Avastin\par 1/5/2021 - MRI stable.\par 3/16/2021 - New MRI Offers no new complaints other than some numbness to the left finger tips. Completed 9 total infusions of Irinotecan along with Avastin\par \par She does acknowledge some cognitive decline and lethargy. She denies any weakness. She is not currently on any corticosteroid. She is on keppra 500mg PO  BID. \par \par She presents today to discuss enrollment in  recurrent  IA cetuximab with irradiation clinical trial. \par

## 2021-04-05 NOTE — VITALS
[80: Normal activity with effort; some signs or symptoms of disease.] : 80: Normal activity with effort; some signs or symptoms of disease.  [ECOG Performance Status: 1 - Restricted in physically strenuous activity but ambulatory and able to carry out work of a light or sedentary nature] : Performance Status: 1 - Restricted in physically strenuous activity but ambulatory and able to carry out work of a light or sedentary nature, e.g., light house work, office work [Date: ____________] : Patient's last distress assessment performed on [unfilled]. [8 - Distress Level] : Distress Level: 8 [Referred Patient  to social work for follow-up] : Patient was referred to social work for follow-up

## 2021-04-06 PROBLEM — C71.9 GLIOBLASTOMA MULTIFORME: Status: ACTIVE | Noted: 2020-06-16

## 2021-04-06 PROBLEM — H53.40 VISUAL FIELD DEFECT OF LEFT EYE: Status: ACTIVE | Noted: 2020-06-16

## 2021-04-06 LAB
ALBUMIN SERPL ELPH-MCNC: 5.1 G/DL
ALP BLD-CCNC: 54 U/L
ALT SERPL-CCNC: 10 U/L
ANION GAP SERPL CALC-SCNC: 11 MMOL/L
AST SERPL-CCNC: 15 U/L
BASOPHILS # BLD AUTO: 0 K/UL
BASOPHILS NFR BLD AUTO: 0 %
BILIRUB SERPL-MCNC: 0.5 MG/DL
BUN SERPL-MCNC: 6 MG/DL
CALCIUM SERPL-MCNC: 10 MG/DL
CHLORIDE SERPL-SCNC: 104 MMOL/L
CO2 SERPL-SCNC: 28 MMOL/L
CREAT SERPL-MCNC: 0.59 MG/DL
EOSINOPHIL # BLD AUTO: 0 K/UL
EOSINOPHIL NFR BLD AUTO: 0 %
GLUCOSE SERPL-MCNC: 103 MG/DL
HCG SERPL-MCNC: <1 MIU/ML
HCT VFR BLD CALC: 48.6 %
HGB BLD-MCNC: 15 G/DL
LYMPHOCYTES # BLD AUTO: 0.28 K/UL
LYMPHOCYTES NFR BLD AUTO: 7.8 %
MAN DIFF?: NORMAL
MCHC RBC-ENTMCNC: 30.5 PG
MCHC RBC-ENTMCNC: 30.9 GM/DL
MCV RBC AUTO: 98.8 FL
MONOCYTES # BLD AUTO: 0.09 K/UL
MONOCYTES NFR BLD AUTO: 2.6 %
NEUTROPHILS # BLD AUTO: 3.19 K/UL
NEUTROPHILS NFR BLD AUTO: 88.7 %
PLATELET # BLD AUTO: 170 K/UL
POTASSIUM SERPL-SCNC: 4.2 MMOL/L
PROT SERPL-MCNC: 7.4 G/DL
RBC # BLD: 4.92 M/UL
RBC # FLD: 11.7 %
SODIUM SERPL-SCNC: 143 MMOL/L
WBC # FLD AUTO: 3.6 K/UL

## 2021-04-06 NOTE — PHYSICAL EXAM
[General Appearance - Alert] : alert [General Appearance - In No Acute Distress] : in no acute distress [Thin] : thin [PERRL With Normal Accommodation] : pupils were equal in size, round, reactive to light [Normal] : normal heart rate and rhythm, normal S1 and S2, and no murmurs present [No Focal Deficits] : no focal deficits [de-identified] : decreased peripheral vision L>R, Poor EOM movement  [de-identified] : 4/5 OMID  and BLE [de-identified] : Depressed with poor eye contact

## 2021-04-06 NOTE — DISEASE MANAGEMENT
[Clinical] : TNM Stage: c [N/A] : Currently not applicable [FreeTextEntry4] : recurrent multifocal GBM [TTNM] : - [NTNM] : - [MTNM] : -

## 2021-04-06 NOTE — HISTORY OF PRESENT ILLNESS
[FreeTextEntry1] : Ms. AYLA CARDONA is 41 year old female here today for initial evaluation of re-irradiation therapy for WHO grade IV GBM. \par \par Ms Cardona was first diagnosed in March 2020 when she reported bifrontal HA unrelieved by Tylenol and confusion.  Her symptoms progressed and she began to get lost while driving.  He  took patient to [facility]. Patient had abnormal neuroimaging.  MRI of brain showed peripherally enhancing mass in the right medial parietal lobe measuring 4.9 x 4.1 x 3.3 cm. She was transferred to [facility], where she had resection done by Dr. Tyler on 5/13/2020.  \par \par 6/4/2020 started radiation therapy with Dr Chamorro ([facility]) and chemo with Dr. Diana. On 6/10/2020 had increase in HA's which prompted another MRI. This scan showed increased enhancement in the dominate lesion crossing into the left side of the posterior corpus callosum. RT plan was modified to accommodate this.\par \par RT dates - 6/14 -7/16/2020 completed 6000 cGY, 30 fractions at a dose of 200 cGy each.  She was kept on Decadron and Keppra throughout her treatment course. \par First cycle of TMZ - 9/5 -9/9\par 10/3 - Second cycle\par 10/27- MRI showed POD. Plan was made to Rx with IV Irinotecan along with IV Avastin\par 11/10/2020- First dose of Irinotecan and IV Avastin\par 1/5/2021 - MRI stable.\par \par 3/16/2021 new MRI with c/o numbness to finer tips and some cognitive decline with lethargy. Plan for consideration of IA-cetuximab with re-RT trial. She saw Dr. Pacheco today and consented to the trial. \par \par Today pt states she is feeling fine but demonstrates some hostility and does not want to cooperate with exam. She presents with her  who assists with history. Per  she has some cognitive decline , memory deficits, trouble with concentration and depression. She continues to work full time. Patient reports visual field defect. Denies any SOB, weakness or change in motor function or current HA's. \par \par MRI Head 3/14/2021\par Impression: Increasing enhancing lesion with further extension into the corpus callosum and a similar appearing right parietal component. Increasing bilateral periatrial and parietal increased FLAIR/T2 signal abnormality  likely representing nonenhancing neoplasm. Increasing involvement of the corpus callosum. Perfusion parameters continue to favor posttreatment related changes are corresponding with the rim enhancing component of the lesion.\par \par Unchanged signal abnormality within the right temporal lobe with unchanged focus of hemorrhage versus mineralization..\par \par MRI Head 1/3/2020\par IMPRESSION: Interval stability compared with the prior imaging study 10/27/2020 with slight decreased T2 and FLAIR signal in the left periatrial region in association with the splenial lesion identified.\par Evidence of residual peripherally enhancing region involving the splenium and right periatrial region relatively unchanged compared with the prior allowing for slight decreased T2 signal extending to the left periatrial region. Perfusion is decreased in this region in terms of decreased cerebral blood flow and cerebral blood volume suggesting treatment-related changes as opposed to residual neoplasm.\par  Abnormal T2 and T2 FLAIR suggesting predominantly nonenhancing neoplasm with possible focus of mineralization or hemorrhage  in the right temporal lobe and extending to the right medial temporal region with some minimal hyperintensity T1 in this region at the level of the choroidal fissure and slight enhancement and susceptibility identified dating back to the initial study 5/14/2020 as well which is stable in appearance. No associated increased cerebral blood flow or blood volume noted in this region when compared with the contralateral side.\par \par MRI Head 8/18/2020\par IMPRESSION:\par \par Decreased size and enhancement of the previously seen necrotic, peripherally enhancing right parietotemporal and splenial lesion.\par \par Decreased size and enhancement of the previously seen necrotic, peripherally enhancing right mesial temporal lesion.\par \par Decreased T2 and FLAIR hyperintense signal surrounding parietotemporal splenial lesion which may represent the presence of nonenhancing neoplasm, edema, and/or posttreatment changes.\par \par Similar nonspecific T2 and FLAIR hyperintense signal in the anteromedial right parietal lobe and right insular region which may represent the presence of nonenhancing neoplasm, edema, and/or posttreatment changes.\par \par MR perfusion demonstrates decreased relative cerebral blood volume and blood flow in the region of the parietotemporal splenial lesion, suggesting the presence of posttreatment changes. The presence of neoplasm is not excluded.\par \par The right mesial temporal lesion is not well evaluated by MR perfusion.

## 2021-04-06 NOTE — REASON FOR VISIT
[Follow-Up: _____] : a [unfilled] follow-up visit [Consideration of Curative Therapy] : consideration of curative therapy for [Brain Tumor] : brain tumor

## 2021-04-06 NOTE — REVIEW OF SYSTEMS
[Patient Intake Form Reviewed] : Patient intake form was reviewed [Visual Disturbances] : visual disturbances [Confused] : confusion [Depression] : depression [Negative] : Integumentary [Blurred Vision: Grade 2 - Symptomatic; limiting instrumental ADL] : Blurred Vision: Grade 2 - Symptomatic; limiting instrumental ADL [Cognitive Disturbance: Grade 2 - Moderate cognitive disability; interfering with work/school/life performance but capable of independent living; specialized resources on part time basis indicated] : Cognitive Disturbance: Grade 2 - Moderate cognitive disability; interfering with work/school/life performance but capable of independent living; specialized resources on part time basis indicated [Concentration Impairment: Grade 2] : Concentration Impairment: Grade 2 - Moderate impairment in attention or decreased level of concentration; limiting instrumental ADL [Dizziness: Grade 0] : Dizziness: Grade 0  [Facial Muscle Weakness: Grade 0] : Facial Muscle Weakness: Grade 0 [Headache: Grade 0] : Headache: Grade 0 [Depression: Grade 1 - Mild depressive symptoms] : Depression: Grade 1 - Mild depressive symptoms [de-identified] : memory deficit, poor concentration

## 2021-04-08 ENCOUNTER — APPOINTMENT (OUTPATIENT)
Dept: RADIOLOGY | Facility: CLINIC | Age: 41
End: 2021-04-08

## 2021-04-08 ENCOUNTER — OUTPATIENT (OUTPATIENT)
Dept: OUTPATIENT SERVICES | Facility: HOSPITAL | Age: 41
LOS: 1 days | End: 2021-04-08
Payer: COMMERCIAL

## 2021-04-08 PROCEDURE — 71046 X-RAY EXAM CHEST 2 VIEWS: CPT | Mod: 26

## 2021-04-08 RX ORDER — FLUOXETINE HYDROCHLORIDE 20 MG/1
20 TABLET ORAL
Qty: 30 | Refills: 1 | Status: ACTIVE | COMMUNITY
Start: 2021-04-08 | End: 1900-01-01

## 2021-04-22 ENCOUNTER — APPOINTMENT (OUTPATIENT)
Dept: INTERVENTIONAL RADIOLOGY/VASCULAR | Facility: HOSPITAL | Age: 41
End: 2021-04-22

## 2021-04-22 ENCOUNTER — OUTPATIENT (OUTPATIENT)
Dept: OUTPATIENT SERVICES | Facility: HOSPITAL | Age: 41
LOS: 1 days | End: 2021-04-22
Payer: COMMERCIAL

## 2021-04-22 PROCEDURE — C1769: CPT

## 2021-04-22 PROCEDURE — 99152 MOD SED SAME PHYS/QHP 5/>YRS: CPT

## 2021-04-22 PROCEDURE — 76937 US GUIDE VASCULAR ACCESS: CPT | Mod: 26

## 2021-04-22 PROCEDURE — C1788: CPT

## 2021-04-22 PROCEDURE — 76937 US GUIDE VASCULAR ACCESS: CPT

## 2021-04-22 PROCEDURE — 36561 INSERT TUNNELED CV CATH: CPT

## 2021-04-22 PROCEDURE — 99153 MOD SED SAME PHYS/QHP EA: CPT

## 2021-05-04 ENCOUNTER — OUTPATIENT (OUTPATIENT)
Dept: OUTPATIENT SERVICES | Facility: HOSPITAL | Age: 41
LOS: 1 days | Discharge: HOME | End: 2021-05-04

## 2021-05-04 ENCOUNTER — RESULT REVIEW (OUTPATIENT)
Age: 41
End: 2021-05-04

## 2021-05-04 ENCOUNTER — APPOINTMENT (OUTPATIENT)
Dept: NEUROLOGY | Facility: CLINIC | Age: 41
End: 2021-05-04
Payer: COMMERCIAL

## 2021-05-04 ENCOUNTER — APPOINTMENT (OUTPATIENT)
Dept: HEMATOLOGY ONCOLOGY | Facility: CLINIC | Age: 41
End: 2021-05-04

## 2021-05-04 VITALS — SYSTOLIC BLOOD PRESSURE: 114 MMHG | HEART RATE: 70 BPM | RESPIRATION RATE: 16 BRPM | DIASTOLIC BLOOD PRESSURE: 83 MMHG

## 2021-05-04 DIAGNOSIS — C71.9 MALIGNANT NEOPLASM OF BRAIN, UNSPECIFIED: ICD-10-CM

## 2021-05-04 LAB
BASOPHILS # BLD AUTO: 0.04 K/UL — SIGNIFICANT CHANGE UP (ref 0–0.2)
BASOPHILS NFR BLD AUTO: 2 % — SIGNIFICANT CHANGE UP (ref 0–2)
EOSINOPHIL # BLD AUTO: 0.04 K/UL — SIGNIFICANT CHANGE UP (ref 0–0.5)
EOSINOPHIL NFR BLD AUTO: 2 % — SIGNIFICANT CHANGE UP (ref 0–6)
HCT VFR BLD CALC: 39.1 % — SIGNIFICANT CHANGE UP (ref 34.5–45)
HGB BLD-MCNC: 13.1 G/DL — SIGNIFICANT CHANGE UP (ref 11.5–15.5)
LYMPHOCYTES # BLD AUTO: 0.74 K/UL — LOW (ref 1–3.3)
LYMPHOCYTES # BLD AUTO: 41 % — SIGNIFICANT CHANGE UP (ref 13–44)
MCHC RBC-ENTMCNC: 30.6 PG — SIGNIFICANT CHANGE UP (ref 27–34)
MCHC RBC-ENTMCNC: 33.5 G/DL — SIGNIFICANT CHANGE UP (ref 32–36)
MCV RBC AUTO: 91.4 FL — SIGNIFICANT CHANGE UP (ref 80–100)
MONOCYTES # BLD AUTO: 0.11 K/UL — SIGNIFICANT CHANGE UP (ref 0–0.9)
MONOCYTES NFR BLD AUTO: 6 % — SIGNIFICANT CHANGE UP (ref 2–14)
NEUTROPHILS # BLD AUTO: 0.88 K/UL — LOW (ref 1.8–7.4)
NEUTROPHILS NFR BLD AUTO: 49 % — SIGNIFICANT CHANGE UP (ref 43–77)
NRBC # BLD: 0 /100 — SIGNIFICANT CHANGE UP (ref 0–0)
NRBC # BLD: SIGNIFICANT CHANGE UP /100 WBCS (ref 0–0)
PLAT MORPH BLD: NORMAL — SIGNIFICANT CHANGE UP
PLATELET # BLD AUTO: 253 K/UL — SIGNIFICANT CHANGE UP (ref 150–400)
RBC # BLD: 4.28 M/UL — SIGNIFICANT CHANGE UP (ref 3.8–5.2)
RBC # FLD: 11.5 % — SIGNIFICANT CHANGE UP (ref 10.3–14.5)
RBC BLD AUTO: SIGNIFICANT CHANGE UP
WBC # BLD: 1.8 K/UL — LOW (ref 3.8–10.5)
WBC # FLD AUTO: 1.8 K/UL — LOW (ref 3.8–10.5)

## 2021-05-04 PROCEDURE — 99215 OFFICE O/P EST HI 40 MIN: CPT

## 2021-05-04 PROCEDURE — 99072 ADDL SUPL MATRL&STAF TM PHE: CPT

## 2021-05-04 RX ORDER — DEXAMETHASONE 4 MG/1
4 TABLET ORAL
Qty: 30 | Refills: 1 | Status: ACTIVE | COMMUNITY
Start: 2021-05-04 | End: 1900-01-01

## 2021-05-04 NOTE — PHYSICAL EXAM
[General Appearance - Alert] : alert [General Appearance - In No Acute Distress] : in no acute distress [] : no respiratory distress [Respiration, Rhythm And Depth] : normal respiratory rhythm and effort [Exaggerated Use Of Accessory Muscles For Inspiration] : no accessory muscle use [FreeTextEntry1] : Patient is awake and alert - oriented and fluent - irritable but appropriate.\par PERRL, EOMI,\par Left VF defect - LQ\par Able to say word HOUSE forwards and backwards\par Able to do simple calculations\par Memory at 3 minutes 2/3 with cues ( Ball, chair, apple)\par No neglect noted\par [place] X 4 with good strength. No drift noted\par Mild unsteadiness with gait (in heels - veers slightly to the left).

## 2021-05-04 NOTE — DISCUSSION/SUMMARY
[FreeTextEntry1] : Patient seen and examined\par Clinically stable\par Recommended to take Dexamethasone 4 mg daily( Patient reluctant in the past to take it due to insomnia)\par Patient to discuss with Dr Toure to see when the next MRI will be and we will co ordinate a follow up around the same time\par In the interim, if any questions patient knows to call our office.\par

## 2021-05-04 NOTE — HISTORY OF PRESENT ILLNESS
[FreeTextEntry1] : Delfina Orona is a 41 yo female who presented at this office for a neuro oncology follow up.\par In brief\par 3/2020 -Developed bifrontal headaches - initially she attributed these to wearing a mask at the beginning of the pandemic. Her symptoms progressed - she began to get lost when driving and had some confusion with tasks that were very ordinary. Headaches also escalated - most significantly post-exercise - and became unrelieved by Tylenol. Her  brought her to the ER at [facility] in early May where neuroimaging was abnormal\par 5/13/2020- Transferred to [facility]. 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 There is also a second area of more solid enhancement - not connected by flair imaging - in the right parahippocampal region measuring about 1 cm. Post-operative MRI scan on 5/14 showed a large right parietal resection cavity with some medial residual enhancement. The right parahippocampal region was unchanged. Patient was able to be discharged home on 5/15/2020. She was discharged on a Decadron taper, Pantoprazole, and Keppra 500 mg bid. There is no history of seizure.\par 6/4/2020- RT started. Chemo was prescribed by Neurosurgeon\par 6/10- Increase in headaches which prompted to repeat the MRI. This scan shows increased enhancement in the dominant lesion crossing into the left side of the posterior corpus callosum. The parahippocampal lesion is slightly bigger - measuring 1.5 cm vs. 1.0 cm.\par RT dates - 6/14 -7/16/2020\par First cycle of TMZ - 9/5 -9/9\par 103 - Second cycle\par 10/27- MRI showed POD. Plan was made to Rx with IV Irinotecan along with IV Avastin\par 11/10/2020- First dose of Irinotecan and IV Avastin\par 1/5/2021 - MRI stable.\par 3/16/2021 - Offers no new complaints other than some numbness to the left finger tips. Completed 9 total infusions of Irinotecan along with Avastin.She continues to work. Her  notes decreased appetite (note 15 pound weight loss over 6 weeks) - unclear if she is following a specific restricted diet. MRI showed POD. \par 5/4/2021- Patient here for a follow up. Patient not a candidate for IA Cetuximab trial. Patient met with Dr Constance pedersen and is currently on Tamoxifen, Vinorelbine, Gefitinib. The treatment course is every other week, had one Rx thus far, Next course due on 5/7.\par She denies headache - she is not taking steroids.\par She is sleeping a full night.\par She is less angry.

## 2021-06-22 ENCOUNTER — OUTPATIENT (OUTPATIENT)
Dept: OUTPATIENT SERVICES | Facility: HOSPITAL | Age: 41
LOS: 1 days | End: 2021-06-22
Payer: COMMERCIAL

## 2021-06-22 ENCOUNTER — APPOINTMENT (OUTPATIENT)
Dept: MRI IMAGING | Facility: HOSPITAL | Age: 41
End: 2021-06-22

## 2021-06-22 PROCEDURE — A9585: CPT

## 2021-06-22 PROCEDURE — 70553 MRI BRAIN STEM W/O & W/DYE: CPT | Mod: 26

## 2021-06-22 PROCEDURE — 70553 MRI BRAIN STEM W/O & W/DYE: CPT

## 2021-06-24 ENCOUNTER — APPOINTMENT (OUTPATIENT)
Dept: MRI IMAGING | Facility: HOSPITAL | Age: 41
End: 2021-06-24

## 2021-09-12 ENCOUNTER — APPOINTMENT (OUTPATIENT)
Dept: MRI IMAGING | Facility: HOSPITAL | Age: 41
End: 2021-09-12

## 2024-12-10 NOTE — PROGRESS NOTE ADULT - REASON FOR ADMISSION
A balloon catheter was inserted. Supply used: (CATHETER BLN NC EMERGE MNRL 4MM 15MM 143CM 2 LUM TPR TIP LL). brain Tumor